# Patient Record
Sex: FEMALE | Race: WHITE | Employment: OTHER | URBAN - METROPOLITAN AREA
[De-identification: names, ages, dates, MRNs, and addresses within clinical notes are randomized per-mention and may not be internally consistent; named-entity substitution may affect disease eponyms.]

---

## 2017-12-28 VITALS — WEIGHT: 270 LBS | BODY MASS INDEX: 49.69 KG/M2 | HEIGHT: 62 IN

## 2017-12-28 RX ORDER — MONTELUKAST SODIUM 10 MG/1
10 TABLET ORAL
COMMUNITY
End: 2020-07-31

## 2017-12-28 NOTE — PERIOP NOTES
Call placed to Dr. Donovan Hernandez, PCP  [phone] office ~message left requesting most recent office note.

## 2017-12-28 NOTE — PERIOP NOTES
Patient verified name, , and surgery as listed in [facility]. Type 1b surgery, phone assessment complete. Orders YES received. Labs per surgeon: none  Labs per anesthesia protocol: potassium  To be drawn at outpatient lab   ECHO dated 16 obtained and placed on chart for review. Patient answered medical/surgical history questions at their best of ability. All prior to admission medications documented in [facility]. Patient instructed to take the following medications the day of surgery according to anesthesia guidelines with a small sip of water: 81 mg Aspirin, Buspar, Flexeril, Prozac, Omeprazole, Singulair; Norco, if needed . Hold all vitamins 7 days prior to surgery and NSAIDS 5 days prior to surgery. Medications to be held Aleve, hold for 5 days prior to surgery; decrease 325 mg Aspirin to 81 mg 5 days prior to surgery. Patient instructed on the following:  Arrive at A Entrance, time of arrival to be called the day before by 1700  NPO after midnight including gum, mints, and ice chips  Responsible adult must drive patient to the hospital, stay during surgery, and patient will  need supervision 24 hours after anesthesia  Use antibacterial soap in shower the night before surgery and on the morning of surgery  Leave all valuables (money and jewelry) at home but bring insurance card and ID on       DOS  Do not wear make-up, nail polish, lotions, cologne, perfumes, powders, or oil on skin. Patient teach back successful and patient demonstrates knowledge of instruction.

## 2017-12-29 ENCOUNTER — HOSPITAL ENCOUNTER (OUTPATIENT)
Dept: SURGERY | Age: 67
Discharge: HOME | End: 2017-12-29

## 2017-12-29 ENCOUNTER — HOSPITAL ENCOUNTER (OUTPATIENT)
Dept: LAB | Age: 67
Discharge: HOME | End: 2017-12-29
Attending: ORTHOPAEDIC SURGERY

## 2017-12-29 LAB — POTASSIUM SERPL-SCNC: 3.3 MMOL/L (ref 3.5–5.1)

## 2017-12-29 NOTE — PERIOP NOTES
Potassium done today within anesthesia protocols.     Recent Results (from the past 12 hour(s))   POTASSIUM    Collection Time: 12/29/17 11:40 AM   Result Value Ref Range    Potassium 3.3 (L) 3.5 - 5.1 mmol/L

## 2018-01-04 ENCOUNTER — ANESTHESIA EVENT (OUTPATIENT)
Dept: SURGERY | Age: 68
End: 2018-01-04
Payer: MEDICARE

## 2018-01-05 ENCOUNTER — HOSPITAL ENCOUNTER (OUTPATIENT)
Age: 68
Setting detail: OUTPATIENT SURGERY
Discharge: HOME | End: 2018-01-05
Attending: ORTHOPAEDIC SURGERY | Admitting: ORTHOPAEDIC SURGERY
Payer: MEDICARE

## 2018-01-05 ENCOUNTER — ANESTHESIA (OUTPATIENT)
Dept: SURGERY | Age: 68
End: 2018-01-05
Payer: MEDICARE

## 2018-01-05 VITALS
WEIGHT: 270 LBS | DIASTOLIC BLOOD PRESSURE: 74 MMHG | RESPIRATION RATE: 18 BRPM | SYSTOLIC BLOOD PRESSURE: 163 MMHG | HEIGHT: 62 IN | OXYGEN SATURATION: 92 % | BODY MASS INDEX: 49.69 KG/M2 | HEART RATE: 98 BPM | TEMPERATURE: 98 F

## 2018-01-05 DIAGNOSIS — M12.811 ROTATOR CUFF TEAR ARTHROPATHY OF RIGHT SHOULDER: Primary | ICD-10-CM

## 2018-01-05 DIAGNOSIS — M75.101 ROTATOR CUFF TEAR ARTHROPATHY OF RIGHT SHOULDER: Primary | ICD-10-CM

## 2018-01-05 PROCEDURE — 74011000250 HC RX REV CODE- 250: Performed by: ANESTHESIOLOGY

## 2018-01-05 PROCEDURE — 76010000149 HC OR TIME 1 TO 1.5 HR: Performed by: ORTHOPAEDIC SURGERY

## 2018-01-05 PROCEDURE — 74011250636 HC RX REV CODE- 250/636: Performed by: ANESTHESIOLOGY

## 2018-01-05 PROCEDURE — 77030003602 HC NDL NRV BLK BBMI -B: Performed by: ANESTHESIOLOGY

## 2018-01-05 PROCEDURE — 74011000250 HC RX REV CODE- 250

## 2018-01-05 PROCEDURE — 77030011263 HC ELECTRD ACRPLSTY CNMD -B: Performed by: ORTHOPAEDIC SURGERY

## 2018-01-05 PROCEDURE — 77030008477 HC STYL SATN SLP COVD -A: Performed by: ANESTHESIOLOGY

## 2018-01-05 PROCEDURE — 76942 ECHO GUIDE FOR BIOPSY: CPT | Performed by: ORTHOPAEDIC SURGERY

## 2018-01-05 PROCEDURE — L3650 SO 8 ABD RESTRAINT PRE OTS: HCPCS | Performed by: ORTHOPAEDIC SURGERY

## 2018-01-05 PROCEDURE — 76210000021 HC REC RM PH II 0.5 TO 1 HR: Performed by: ORTHOPAEDIC SURGERY

## 2018-01-05 PROCEDURE — 77030006891 HC BLD SHV RESECT STRY -B: Performed by: ORTHOPAEDIC SURGERY

## 2018-01-05 PROCEDURE — 94640 AIRWAY INHALATION TREATMENT: CPT

## 2018-01-05 PROCEDURE — 76060000034 HC ANESTHESIA 1.5 TO 2 HR: Performed by: ORTHOPAEDIC SURGERY

## 2018-01-05 PROCEDURE — A4565 SLINGS: HCPCS | Performed by: ORTHOPAEDIC SURGERY

## 2018-01-05 PROCEDURE — 74011250637 HC RX REV CODE- 250/637

## 2018-01-05 PROCEDURE — 76210000000 HC OR PH I REC 2 TO 2.5 HR: Performed by: ORTHOPAEDIC SURGERY

## 2018-01-05 PROCEDURE — 74011250636 HC RX REV CODE- 250/636

## 2018-01-05 PROCEDURE — 77030008703 HC TU ET UNCUF COVD -A: Performed by: ANESTHESIOLOGY

## 2018-01-05 PROCEDURE — 77030016570 HC BLNKT BAIR HGGR 3M -B: Performed by: ANESTHESIOLOGY

## 2018-01-05 PROCEDURE — 77030011640 HC PAD GRND REM COVD -A: Performed by: ORTHOPAEDIC SURGERY

## 2018-01-05 PROCEDURE — 77030002933 HC SUT MCRYL J&J -A: Performed by: ORTHOPAEDIC SURGERY

## 2018-01-05 PROCEDURE — 77030003666 HC NDL SPINAL BD -A: Performed by: ORTHOPAEDIC SURGERY

## 2018-01-05 PROCEDURE — 77030020782 HC GWN BAIR PAWS FLX 3M -B: Performed by: ANESTHESIOLOGY

## 2018-01-05 PROCEDURE — 77030018836 HC SOL IRR NACL ICUM -A: Performed by: ORTHOPAEDIC SURGERY

## 2018-01-05 PROCEDURE — 76010010054 HC POST OP PAIN BLOCK: Performed by: ORTHOPAEDIC SURGERY

## 2018-01-05 PROCEDURE — 77030010428: Performed by: ORTHOPAEDIC SURGERY

## 2018-01-05 RX ORDER — DEXAMETHASONE SODIUM PHOSPHATE 100 MG/10ML
INJECTION INTRAMUSCULAR; INTRAVENOUS AS NEEDED
Status: DISCONTINUED | OUTPATIENT
Start: 2018-01-05 | End: 2018-01-05 | Stop reason: HOSPADM

## 2018-01-05 RX ORDER — LIDOCAINE HYDROCHLORIDE 20 MG/ML
INJECTION, SOLUTION EPIDURAL; INFILTRATION; INTRACAUDAL; PERINEURAL AS NEEDED
Status: DISCONTINUED | OUTPATIENT
Start: 2018-01-05 | End: 2018-01-05 | Stop reason: HOSPADM

## 2018-01-05 RX ORDER — ROCURONIUM BROMIDE 10 MG/ML
INJECTION, SOLUTION INTRAVENOUS AS NEEDED
Status: DISCONTINUED | OUTPATIENT
Start: 2018-01-05 | End: 2018-01-05 | Stop reason: HOSPADM

## 2018-01-05 RX ORDER — LEVALBUTEROL INHALATION SOLUTION 1.25 MG/3ML
1.25 SOLUTION RESPIRATORY (INHALATION)
Status: COMPLETED | OUTPATIENT
Start: 2018-01-05 | End: 2018-01-05

## 2018-01-05 RX ORDER — MIDAZOLAM HYDROCHLORIDE 1 MG/ML
2 INJECTION, SOLUTION INTRAMUSCULAR; INTRAVENOUS
Status: DISCONTINUED | OUTPATIENT
Start: 2018-01-05 | End: 2018-01-05 | Stop reason: HOSPADM

## 2018-01-05 RX ORDER — OXYCODONE HYDROCHLORIDE 5 MG/1
5-10 TABLET ORAL
Qty: 40 TAB | Refills: 0 | Status: SHIPPED | OUTPATIENT
Start: 2018-01-05 | End: 2018-12-12

## 2018-01-05 RX ORDER — GLYCOPYRROLATE 0.2 MG/ML
INJECTION INTRAMUSCULAR; INTRAVENOUS AS NEEDED
Status: DISCONTINUED | OUTPATIENT
Start: 2018-01-05 | End: 2018-01-05 | Stop reason: HOSPADM

## 2018-01-05 RX ORDER — FENTANYL CITRATE 50 UG/ML
100 INJECTION, SOLUTION INTRAMUSCULAR; INTRAVENOUS ONCE
Status: COMPLETED | OUTPATIENT
Start: 2018-01-05 | End: 2018-01-05

## 2018-01-05 RX ORDER — SUCCINYLCHOLINE CHLORIDE 20 MG/ML
INJECTION INTRAMUSCULAR; INTRAVENOUS AS NEEDED
Status: DISCONTINUED | OUTPATIENT
Start: 2018-01-05 | End: 2018-01-05 | Stop reason: HOSPADM

## 2018-01-05 RX ORDER — OXYCODONE AND ACETAMINOPHEN 10; 325 MG/1; MG/1
1 TABLET ORAL AS NEEDED
Status: DISCONTINUED | OUTPATIENT
Start: 2018-01-05 | End: 2018-01-05 | Stop reason: HOSPADM

## 2018-01-05 RX ORDER — ALBUTEROL SULFATE 90 UG/1
AEROSOL, METERED RESPIRATORY (INHALATION) AS NEEDED
Status: DISCONTINUED | OUTPATIENT
Start: 2018-01-05 | End: 2018-01-05 | Stop reason: HOSPADM

## 2018-01-05 RX ORDER — ONDANSETRON 2 MG/ML
INJECTION INTRAMUSCULAR; INTRAVENOUS AS NEEDED
Status: DISCONTINUED | OUTPATIENT
Start: 2018-01-05 | End: 2018-01-05 | Stop reason: HOSPADM

## 2018-01-05 RX ORDER — ROPIVACAINE HYDROCHLORIDE 5 MG/ML
INJECTION, SOLUTION EPIDURAL; INFILTRATION; PERINEURAL AS NEEDED
Status: DISCONTINUED | OUTPATIENT
Start: 2018-01-05 | End: 2018-01-05 | Stop reason: HOSPADM

## 2018-01-05 RX ORDER — SODIUM CHLORIDE 0.9 % (FLUSH) 0.9 %
5-10 SYRINGE (ML) INJECTION AS NEEDED
Status: DISCONTINUED | OUTPATIENT
Start: 2018-01-05 | End: 2018-01-05 | Stop reason: HOSPADM

## 2018-01-05 RX ORDER — PROPOFOL 10 MG/ML
INJECTION, EMULSION INTRAVENOUS AS NEEDED
Status: DISCONTINUED | OUTPATIENT
Start: 2018-01-05 | End: 2018-01-05 | Stop reason: HOSPADM

## 2018-01-05 RX ORDER — OXYCODONE HYDROCHLORIDE 5 MG/1
5 TABLET ORAL
Status: DISCONTINUED | OUTPATIENT
Start: 2018-01-05 | End: 2018-01-05 | Stop reason: HOSPADM

## 2018-01-05 RX ORDER — HYDROMORPHONE HYDROCHLORIDE 2 MG/ML
0.5 INJECTION, SOLUTION INTRAMUSCULAR; INTRAVENOUS; SUBCUTANEOUS
Status: DISCONTINUED | OUTPATIENT
Start: 2018-01-05 | End: 2018-01-05 | Stop reason: HOSPADM

## 2018-01-05 RX ORDER — EPHEDRINE SULFATE 50 MG/ML
INJECTION, SOLUTION INTRAVENOUS AS NEEDED
Status: DISCONTINUED | OUTPATIENT
Start: 2018-01-05 | End: 2018-01-05 | Stop reason: HOSPADM

## 2018-01-05 RX ORDER — FENTANYL CITRATE 50 UG/ML
INJECTION, SOLUTION INTRAMUSCULAR; INTRAVENOUS AS NEEDED
Status: DISCONTINUED | OUTPATIENT
Start: 2018-01-05 | End: 2018-01-05 | Stop reason: HOSPADM

## 2018-01-05 RX ORDER — SODIUM CHLORIDE, SODIUM LACTATE, POTASSIUM CHLORIDE, CALCIUM CHLORIDE 600; 310; 30; 20 MG/100ML; MG/100ML; MG/100ML; MG/100ML
75 INJECTION, SOLUTION INTRAVENOUS CONTINUOUS
Status: DISCONTINUED | OUTPATIENT
Start: 2018-01-05 | End: 2018-01-05 | Stop reason: HOSPADM

## 2018-01-05 RX ORDER — NEOSTIGMINE METHYLSULFATE 1 MG/ML
INJECTION INTRAVENOUS AS NEEDED
Status: DISCONTINUED | OUTPATIENT
Start: 2018-01-05 | End: 2018-01-05 | Stop reason: HOSPADM

## 2018-01-05 RX ADMIN — FENTANYL CITRATE 100 MCG: 50 INJECTION INTRAMUSCULAR; INTRAVENOUS at 08:57

## 2018-01-05 RX ADMIN — GLYCOPYRROLATE 0.4 MG: 0.2 INJECTION INTRAMUSCULAR; INTRAVENOUS at 11:58

## 2018-01-05 RX ADMIN — RACEPINEPHRINE HYDROCHLORIDE 0.5 ML: 11.25 SOLUTION RESPIRATORY (INHALATION) at 12:58

## 2018-01-05 RX ADMIN — SUCCINYLCHOLINE CHLORIDE 120 MG: 20 INJECTION INTRAMUSCULAR; INTRAVENOUS at 10:55

## 2018-01-05 RX ADMIN — SODIUM CHLORIDE, SODIUM LACTATE, POTASSIUM CHLORIDE, AND CALCIUM CHLORIDE: 600; 310; 30; 20 INJECTION, SOLUTION INTRAVENOUS at 11:34

## 2018-01-05 RX ADMIN — DEXAMETHASONE SODIUM PHOSPHATE 4 MG: 100 INJECTION INTRAMUSCULAR; INTRAVENOUS at 11:25

## 2018-01-05 RX ADMIN — ALBUTEROL SULFATE 3 PUFF: 90 AEROSOL, METERED RESPIRATORY (INHALATION) at 11:00

## 2018-01-05 RX ADMIN — ROCURONIUM BROMIDE 30 MG: 10 INJECTION, SOLUTION INTRAVENOUS at 11:20

## 2018-01-05 RX ADMIN — FENTANYL CITRATE 100 MCG: 50 INJECTION, SOLUTION INTRAMUSCULAR; INTRAVENOUS at 10:55

## 2018-01-05 RX ADMIN — LEVALBUTEROL 1.25 MG: 1.25 SOLUTION RESPIRATORY (INHALATION) at 12:23

## 2018-01-05 RX ADMIN — ONDANSETRON 4 MG: 2 INJECTION INTRAMUSCULAR; INTRAVENOUS at 11:54

## 2018-01-05 RX ADMIN — LIDOCAINE HYDROCHLORIDE 60 MG: 20 INJECTION, SOLUTION EPIDURAL; INFILTRATION; INTRACAUDAL; PERINEURAL at 10:55

## 2018-01-05 RX ADMIN — ROCURONIUM BROMIDE 5 MG: 10 INJECTION, SOLUTION INTRAVENOUS at 10:55

## 2018-01-05 RX ADMIN — ROPIVACAINE HYDROCHLORIDE 20 ML: 5 INJECTION, SOLUTION EPIDURAL; INFILTRATION; PERINEURAL at 09:00

## 2018-01-05 RX ADMIN — MIDAZOLAM HYDROCHLORIDE 1 MG: 1 INJECTION, SOLUTION INTRAMUSCULAR; INTRAVENOUS at 08:57

## 2018-01-05 RX ADMIN — SODIUM CHLORIDE, SODIUM LACTATE, POTASSIUM CHLORIDE, AND CALCIUM CHLORIDE 75 ML/HR: 600; 310; 30; 20 INJECTION, SOLUTION INTRAVENOUS at 08:43

## 2018-01-05 RX ADMIN — NEOSTIGMINE METHYLSULFATE 9 MG: 1 INJECTION INTRAVENOUS at 11:58

## 2018-01-05 RX ADMIN — EPHEDRINE SULFATE 15 MG: 50 INJECTION, SOLUTION INTRAVENOUS at 11:16

## 2018-01-05 RX ADMIN — PROPOFOL 140 MG: 10 INJECTION, EMULSION INTRAVENOUS at 10:55

## 2018-01-05 NOTE — ANESTHESIA POSTPROCEDURE EVALUATION
Post-Anesthesia Evaluation and Assessment    Patient: Denis Butler MRN: [id number]  SSN: [id number]    YOB: 1950  Age: 69 y.o. Sex: female       Cardiovascular Function/Vital Signs  Visit Vitals    /58 (BP 1 Location: Left arm, BP Patient Position: At rest)    Pulse (!) 112    Temp 36.7 °C (98 °F)    Resp 18    Ht 5' 2\" (1.575 m)    Wt 122.5 kg (270 lb)    SpO2 93%    BMI 49.38 kg/m2       Patient is status post general anesthesia for Procedure(s):  RIGHT ARTHROSCOPIC BICEPS TENOTOMY and ACROMIOPLASTY. Nausea/Vomiting: None    Postoperative hydration reviewed and adequate. Pain:  Pain Scale 1: Visual (01/05/18 1213)  Pain Intensity 1: 0 (01/05/18 1213)   Managed    Neurological Status:   Neuro (WDL): Exceptions to WDL (01/05/18 1213)  Neuro  Neurologic State: Drowsy (01/05/18 1213)  LUE Motor Response: Purposeful (01/05/18 1213)  LLE Motor Response: Purposeful (01/05/18 1213)  RUE Motor Response: Numbness; Pharmocologically paralyzed (01/05/18 1213)  RLE Motor Response: Purposeful (01/05/18 1213)   At baseline    Mental Status and Level of Consciousness: Arousable    Pulmonary Status:   O2 Device: Nasal cannula (01/05/18 1243)   Adequate oxygenation and airway patent    Complications related to anesthesia: None    Post-anesthesia assessment completed.  No concerns    Signed By: Gio Pinedo MD     January 5, 2018

## 2018-01-05 NOTE — ANESTHESIA PREPROCEDURE EVALUATION
Anesthetic History     PONV          Review of Systems / Medical History  Patient summary reviewed and pertinent labs reviewed    Pulmonary  Within defined limits                 Neuro/Psych         Psychiatric history    Comments: Chronic pain syndrome-long term PO narcotics Cardiovascular    Hypertension: well controlled              Exercise tolerance: <4 METS     GI/Hepatic/Renal     GERD: well controlled           Endo/Other        Morbid obesity and arthritis     Other Findings              Physical Exam    Airway  Mallampati: II  TM Distance: > 6 cm  Neck ROM: normal range of motion   Mouth opening: Normal     Cardiovascular    Rhythm: regular           Dental    Dentition: Full upper dentures     Pulmonary                 Abdominal  GI exam deferred       Other Findings            Anesthetic Plan    ASA: 3  Anesthesia type: general - interscalene block      Post-op pain plan if not by surgeon: peripheral nerve block single    Induction: Intravenous  Anesthetic plan and risks discussed with: Patient

## 2018-01-05 NOTE — ANESTHESIA PROCEDURE NOTES
Peripheral Block    Start time: 1/5/2018 8:57 AM  End time: 1/5/2018 9:00 AM  Performed by: Star Fenton by: Jeannette Hernandez: Indications: at surgeon's request and post-op pain management    Preanesthetic Checklist: patient identified, risks and benefits discussed, site marked, timeout performed, anesthesia consent given and patient being monitored    Timeout Time: 08:57          Block Type:   Block Type:   Interscalene  Laterality:  Right  Monitoring:  Standard ASA monitoring, continuous pulse ox, frequent vital sign checks, heart rate, oxygen and responsive to questions  Injection Technique:  Single shot  Procedures: ultrasound guided and nerve stimulator    Patient Position: supine  Location:  Interscalene  Needle Gauge:  21 G  Needle Localization:  Anatomical landmarks and ultrasound guidance  Motor Response: minimal motor response >0.4 mA    Medication Injected:  0.5%  ropivacaine  Volume (mL):  20    Assessment:  Number of attempts:  1  Injection Assessment:  Incremental injection every 5 mL, local visualized surrounding nerve on ultrasound, negative aspiration for blood, no intravascular symptoms, no paresthesia and ultrasound image on chart  Patient tolerance:  Patient tolerated the procedure well with no immediate complications

## 2018-01-05 NOTE — IP AVS SNAPSHOT
[address] [place] [address] [id number] 
[phone] Patient: Devon Patino MRN: [id number]:[id number] About your hospitalization You were admitted on:  January 5, 2018 You last received care in the:  [facility] PACU You were discharged on:  January 5, 2018 Why you were hospitalized Your primary diagnosis was:  Not on File Follow-up Information Follow up With Details Comments Contact Info [address] [facility] [place] [address] [phone] Discharge Orders None A check celia indicates which time of day the medication should be taken. My Medications CHANGE how you take these medications Instructions Each Dose to Equal  
 Morning Noon Evening Bedtime * oxyCODONE IR 10 mg Tab immediate release tablet Commonly known as:  mAy Vargas What changed:  Another medication with the same name was added. Make sure you understand how and when to take each. Your last dose was: Your next dose is: Take 0.5 Tabs by mouth every four (4) hours as needed. Max Daily Amount: 30 mg. Indications: PAIN  
 5 mg * oxyCODONE IR 5 mg immediate release tablet Commonly known as:  Amy Vargas What changed: You were already taking a medication with the same name, and this prescription was added. Make sure you understand how and when to take each. Your last dose was: Your next dose is: Take 1-2 Tabs by mouth every four (4) hours as needed for Pain. Max Daily Amount: 60 mg.  
 5-10 mg  
    
   
   
   
  
 * Notice: This list has 2 medication(s) that are the same as other medications prescribed for you. Read the directions carefully, and ask your doctor or other care provider to review them with you. CONTINUE taking these medications Instructions Each Dose to Equal  
 Morning Noon Evening Bedtime ALEVE 220 mg Cap Generic drug:  naproxen sodium Your last dose was: Your next dose is: Take  by mouth. Stop 5 days prior to surgery per anesthesia guidelines. Indications: PAIN  
     
   
   
   
  
 aspirin 325 mg tablet Commonly known as:  ASPIRIN Your last dose was: Your next dose is: Take 325 mg by mouth daily. Decrease to 81 mg starting 5 days prior to surgery   Indications: \"stroke prevention\" 325 mg  
    
   
   
   
  
 busPIRone 5 mg tablet Commonly known as:  BUSPAR Your last dose was: Your next dose is: Take 5 mg by mouth daily. Take morning of surgery per anesthesia guidelines. 5 mg  
    
   
   
   
  
 cyclobenzaprine 10 mg tablet Commonly known as:  FLEXERIL Your last dose was: Your next dose is: Take  by mouth three (3) times daily. Take morning of surgery per anesthesia guidelines. Indications: back pain FLUoxetine 20 mg capsule Commonly known as:  PROzac Your last dose was: Your next dose is: Take 20 mg by mouth daily. Take morning of surgery per anesthesia guidelines. Indications: anxiety 20 mg  
    
   
   
   
  
 gabapentin 600 mg tablet Commonly known as:  NEURONTIN Your last dose was: Your next dose is: Take 600 mg by mouth nightly. Indications: RESTLESS LEGS SYNDROME  
 600 mg  
    
   
   
   
  
 indapamide 2.5 mg tablet Commonly known as:  José Saas Your last dose was: Your next dose is: Take  by mouth daily. Indications: HYPERTENSION  
     
   
   
   
  
 LASIX 40 mg tablet Generic drug:  furosemide Your last dose was: Your next dose is: Take 40 mg by mouth as needed. Indications: EDEMA 40 mg  
    
   
   
   
  
 lidocaine 5 % Commonly known as:  Tino Esquivel Your last dose was: Your next dose is: by TransDERmal route as needed. Apply patch to the affected area for 12 hours a day and remove for 12 hours a day. lisinopril 20 mg tablet Commonly known as:  Amber Carrillo Your last dose was: Your next dose is: Take 20 mg by mouth daily. Indications: HYPERTENSION  
 20 mg NORCO 5-325 mg per tablet Generic drug:  HYDROcodone-acetaminophen Your last dose was: Your next dose is: Take  by mouth as needed for Pain. Take morning of surgery per anesthesia guidelines if needed   Indications: PAIN  
     
   
   
   
  
 omeprazole 20 mg capsule Commonly known as:  PRILOSEC Your last dose was: Your next dose is: Take 20 mg by mouth two (2) times a day. Take morning of surgery per anesthesia guidelines. Indications: GASTROESOPHAGEAL REFLUX  
 20 mg  
    
   
   
   
  
 potassium chloride SR 8 mEq tablet Commonly known as:  KLOR-CON 8 Your last dose was: Your next dose is: Take 8 mEq by mouth two (2) times a day. Indications: HYPOKALEMIA PREVENTION  
 8 mEq  
    
   
   
   
  
 pramipexole 0.25 mg tablet Commonly known as:  MIRAPEX Your last dose was: Your next dose is: Take 0.25 mg by mouth nightly. Indications: RESTLESS LEGS SYNDROME  
 0.25 mg  
    
   
   
   
  
 SINGULAIR 10 mg tablet Generic drug:  montelukast  
   
Your last dose was: Your next dose is: Take 10 mg by mouth daily. For fluid behind left ear; r/t MRSA ; Take / use AM day of surgery  per anesthesia protocols. Indications: Allergic Rhinitis 10 mg  
    
   
   
   
  
 tolterodine ER 4 mg ER capsule Commonly known as:  Akash Hobbs Your last dose was: Your next dose is: Take 4 mg by mouth nightly. 4 mg VITAMIN B COMPLEX PO Your last dose was: Your next dose is: Take  by mouth daily. VITAMIN D3 PO Your last dose was: Your next dose is: Take  by mouth daily. VITAMIN E ACETATE PO Your last dose was: Your next dose is: Take  by mouth daily. Where to Get Your Medications Information on where to get these meds will be given to you by the nurse or doctor. ! Ask your nurse or doctor about these medications  
  oxyCODONE IR 5 mg immediate release tablet Discharge Instructions Arthroscopic Acromioplasty Discharge Instructions ACTIVITY:  
- You may use your operated arm as much as you pain allows you to use it - Use arm sling until you are comfortable without it 
- It's OK to bathe or shower as you normally would. Your dressing is waterproof. DIET: 
- Clear liquids until no nausea or vomiting; then light diet for the first day - Advance to regular diet as you feel like it 
- If nausea and vomiting occurs,use the phenergan prescription you were given by Dr Radha Ardon. If the phenergan doesn't work please call our doctor on call. (Call [phone] if it  is after regular office hours) PAIN: 
- Take pain medication(s) as directed by the prescription you were given - Remember that it often helps to take acetaminophen with your pain medicine IF IT DOES NOT CONTAIN ACETAMINOPHEN. You may take up to 8 extra-strength tylenol or up to 12 regular tylenol per 24 hours. - You may also use ibuprofen 800 mg every six hours or aleve 440 mg every 8 hours IN ADDITION TO your prescribed pain medicine - Call Dr Radha Ardon if pain is NOT adequately relieved by medication DRESSING CARE: 
- There is no need to change your dressing before your follow up visit CALL YOUR DOCTOR IF: 
- You notice excessive bleeding that does not stop after holding mild pressure over the area - Temperature of 100.5°F or greater - Redness, excessive swelling or bruising, and/or green or yellow, smelly discharge from incision AFTER ANESTHESIA: 
- For the next 12 hours: DO NOT drive, drink alcoholic beverages, or make important decisions - Be aware of dizziness following anesthesia and while taking pain medication(s) MEDICATIONS: 
Continue your usual home medications as previously prescribed unless you were told otherwise Signed: Mohan Fuller MD 
1/5/2018 
12:03 PM 
 
 
 
 
 
  
  
  
Introducing \[facility]\"" & [facility] SERVICES! Daren Vazquez introduces [employer] patient portal. Now you can access parts of your medical record, email your doctor's office, and request medication refills online. 1. In your internet browser, go to [email or web address] 2. Click on the First Time User? Click Here link in the Sign In box. You will see the New Member Sign Up page. 3. Enter your [employer] Access Code exactly as it appears below. You will not need to use this code after youve completed the sign-up process. If you do not sign up before the expiration date, you must request a new code. · [employer] Access Code: [email or web address] Expires: 3/27/2018  5:26 AM 
 
4. Enter the last four digits of your Social Security Number (xxxx) and Date of Birth (mm/dd/yyyy) as indicated and click Submit. You will be taken to the next sign-up page. 5. Create a [employer] ID. This will be your [employer] login ID and cannot be changed, so think of one that is secure and easy to remember. 6. Create a [employer] password. You can change your password at any time. 7. Enter your Password Reset Question and Answer. This can be used at a later time if you forget your password. 8. Enter your e-mail address. You will receive e-mail notification when new information is available in [address] 9. Click Sign Up. You can now view and download portions of your medical record. 10. Click the Download Summary menu link to download a portable copy of your medical information. If you have questions, please visit the Frequently Asked Questions section of the [employer] website. Remember, [employer] is NOT to be used for urgent needs. For medical emergencies, dial 911. Now available from your iPhone and Android! Providers Seen During Your Hospitalization Provider Specialty Primary office phone Maple Osler, MD Orthopedic Surgery [phone] Your Primary Care Physician (PCP) Primary Care Physician Office Phone Office Fax Marely Dave Bronson [phone] You are allergic to the following No active allergies Recent Documentation Height Weight BMI OB Status Smoking Status 1.575 m 122.5 kg 49.38 kg/m2 Hysterectomy Never Smoker Emergency Contacts Name Discharge Info Relation Home Work Mobile Elbert Dys DISCHARGE CAREGIVER [3] Spouse [3]   [phone] Patient Belongings The following personal items are in your possession at time of discharge: 
  Dental Appliances: Uppers  Visual Aid: Glasses Please provide this summary of care documentation to your next provider. Signatures-by signing, you are acknowledging that this After Visit Summary has been reviewed with you and you have received a copy. Patient Signature:  ____________________________________________________________ Date:  ____________________________________________________________  
  
Sharee Dotson Provider Signature:  ____________________________________________________________ Date:  ____________________________________________________________

## 2018-01-05 NOTE — BRIEF OP NOTE
BRIEF OPERATIVE NOTE    Date of Procedure: 1/5/2018   Preoperative Diagnosis: Strain of muscle, fascia and tendon of other parts of biceps, right arm, initial encounter [S46.211A]  Postoperative Diagnosis: Strain of muscle, fascia and tendon of other parts of biceps, right arm, initial encounter [K76.246Q]    Procedure(s):  RIGHT ARTHROSCOPIC BICEPS TENOTOMY and ACROMIOPLASTY  Surgeon(s) and Role:     * Silvestre Carrillo MD - Primary         Assistant Staff:       Surgical Staff:  Circ-1: Aviva Fitzgerald RN  Scrub Tech-1: Awais Zheng  Scrub Tech-2: Chelsea Klinefelter  Event Time In   Incision Start 1119   Incision Close 1156     Anesthesia: General   Estimated Blood Loss: <50cc  Specimens: * No specimens in log *   Findings: cuff tear arthropathy, subscap teard, complete,    Complications: none  Implants: * No implants in log *

## 2018-01-05 NOTE — OP NOTES
[facility] 63  OPERATIVE REPORT    Agus Foote  MR#: [id number]  : 1950  ACCOUNT #: [de-identified]   DATE OF SERVICE: 2018    PREOPERATIVE DIAGNOSES:    1. Partial biceps tendon tear of the right shoulder. 2.  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 POSTOPERATIVE  DIAGNOSES:    1. Partial biceps tendon tear of the right shoulder. 2.  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 PROCEDURES PERFORMED:    1. Biceps tenotomy. 2.  Arthroscopic acromioplasty. SURGEON:  Mari Bartlett MD.      ANESTHESIA:       ESTIMATED BLOOD LOSS:  <50cc    SPECIMENS REMOVED:      COMPLICATIONS:  none     FINDINGS:  She had a massive rotator cuff tear. The majority of the articular surface of the glenoid was severely diseased, but there were no areas of bare bone. The majority of the humeral head articular cartilage was fibrillated and the central portion had a thin layer of fibrocartilage only involving a surface area about the size of a quarter. Her biceps tendon had substantial longitudinal tearing and was impinging within the joint. At the completion of the tenotomy, none of that remained within the joint. She had no connected subscapularis tendon. The inferior and middle glenohumeral ligament remained connected. PROCEDURE IN DETAIL:  In the operating room, she was anesthetized. In the beach chair, position her right shoulder was prepped and draped in a sterile fashion. I distended the shoulder with irrigant from a posterior puncture and placed an 18-gauge needle through the rotator cuff interval.  The arthroscope was inserted posteriorly and the initial survey was undertaken. The highlights were photographed. Through the rotator cuff interval, I inserted electrocautery and began biceps tenotomy. I completed the tenotomy with an aggressive shaver.   The shaver was also used to debride the edge of the rotator cuff tendon to ensure that none would pinch within the joint. A lateral acromial incision was made and using an aggressive cutter, an acromioplasty was used to create a flat undersurface of acromion. The highlights of the procedure were photographed. All apparatus was removed and the wounds were then closed with interrupted Monocryl in the subcutaneous, Mastisol and Steri-Strips on the skin followed by the application of a shoulder immobilizer.       Araseli Gordon MD       [facility] / [facility]  D: 01/05/2018 12:07     T: 01/05/2018 12:35  JOB #: [id number]

## 2018-01-05 NOTE — ANESTHESIA POSTPROCEDURE EVALUATION
Post-Anesthesia Evaluation and Assessment    Patient: Josh Everett MRN: [id number]  SSN: [id number]    YOB: 1950  Age: 69 y.o. Sex: female       Cardiovascular Function/Vital Signs  Visit Vitals    /74    Pulse 98    Temp 36.7 °C (98 °F)    Resp 18    Ht 5' 2\" (1.575 m)    Wt 122.5 kg (270 lb)    SpO2 92%    BMI 49.38 kg/m2       Patient is status post general anesthesia for Procedure(s):  RIGHT ARTHROSCOPIC BICEPS TENOTOMY and ACROMIOPLASTY. Nausea/Vomiting: None    Postoperative hydration reviewed and adequate. Pain:  Pain Scale 1: Numeric (0 - 10) (01/05/18 1413)  Pain Intensity 1: 0 (01/05/18 1413)   Managed    Neurological Status:   Neuro (WDL): Exceptions to WDL (01/05/18 1258)  Neuro  Neurologic State: Alert (01/05/18 1258)  LUE Motor Response: Purposeful (01/05/18 1258)  LLE Motor Response: Purposeful (01/05/18 1258)  RUE Motor Response: Numbness (01/05/18 1258)  RLE Motor Response: Purposeful (01/05/18 1258)   Block resolving    Mental Status and Level of Consciousness: Alert and oriented     Pulmonary Status:   O2 Device: Room air (01/05/18 1413)   Adequate oxygenation and airway patent    Complications related to anesthesia: None    Post-anesthesia assessment completed.  No concerns    Signed By: Cleo Muller MD     January 5, 2018

## 2018-01-05 NOTE — DISCHARGE INSTRUCTIONS
Arthroscopic Acromioplasty Discharge Instructions    ACTIVITY:   - You may use your operated arm as much as you pain allows you to use it  - Use arm sling until you are comfortable without it  - It's OK to bathe or shower as you normally would. Your dressing is waterproof. DIET:  - Clear liquids until no nausea or vomiting; then light diet for the first day  - Advance to regular diet as you feel like it  - If nausea and vomiting occurs,use the phenergan prescription you were given by Dr Hinton Brunner. If the phenergan doesn't work please call our doctor on call. (Call [phone] if it  is after regular office hours)    PAIN:  - Take pain medication(s) as directed by the prescription you were given  - Remember that it often helps to take acetaminophen with your pain medicine IF IT DOES NOT CONTAIN ACETAMINOPHEN. You may take up to 8 extra-strength tylenol or up to 12 regular tylenol per 24 hours.    - You may also use ibuprofen 800 mg every six hours or aleve 440 mg every 8 hours IN ADDITION TO your prescribed pain medicine  - Call Dr Hinton Brunner if pain is NOT adequately relieved by medication    DRESSING CARE:  - There is no need to change your dressing before your follow up visit    Jim 18 IF:  - You notice excessive bleeding that does not stop after holding mild pressure over the area  - Temperature of 100.5°F or greater  - Redness, excessive swelling or bruising, and/or green or yellow, smelly discharge from incision    AFTER ANESTHESIA:  - For the next 12 hours: DO NOT drive, drink alcoholic beverages, or make important decisions  - Be aware of dizziness following anesthesia and while taking pain medication(s)    MEDICATIONS:  Continue your usual home medications as previously prescribed unless you were told otherwise    Signed:  Cierra Mclaughlin MD  1/5/2018  12:03 PM

## 2018-12-07 RX ORDER — CEFAZOLIN SODIUM/WATER 2 G/20 ML
2 SYRINGE (ML) INTRAVENOUS ONCE
Status: CANCELLED | OUTPATIENT
Start: 2018-12-07 | End: 2018-12-07

## 2018-12-12 ENCOUNTER — HOSPITAL ENCOUNTER (OUTPATIENT)
Dept: SURGERY | Age: 68
Discharge: HOME | End: 2018-12-12
Attending: ORTHOPAEDIC SURGERY
Payer: MEDICARE

## 2018-12-12 LAB
ANION GAP SERPL CALC-SCNC: 10 MMOL/L
BACTERIA SPEC CULT: ABNORMAL
BUN SERPL-MCNC: 34 MG/DL (ref 8–23)
CALCIUM SERPL-MCNC: 8.9 MG/DL (ref 8.3–10.4)
CHLORIDE SERPL-SCNC: 101 MMOL/L (ref 98–107)
CO2 SERPL-SCNC: 29 MMOL/L (ref 21–32)
CREAT SERPL-MCNC: 1.59 MG/DL (ref 0.6–1)
ERYTHROCYTE [DISTWIDTH] IN BLOOD BY AUTOMATED COUNT: 15.2 % (ref 11.9–14.6)
GLUCOSE SERPL-MCNC: 126 MG/DL (ref 65–100)
HCT VFR BLD AUTO: 39.6 % (ref 35.8–46.3)
HGB BLD-MCNC: 12.6 G/DL (ref 11.7–15.4)
MCH RBC QN AUTO: 31.6 PG (ref 26.1–32.9)
MCHC RBC AUTO-ENTMCNC: 31.8 G/DL (ref 31.4–35)
MCV RBC AUTO: 99.2 FL (ref 79.6–97.8)
NRBC # BLD: 0 K/UL (ref 0–0.2)
PLATELET # BLD AUTO: 198 K/UL (ref 150–450)
PMV BLD AUTO: 11.7 FL (ref 9.4–12.3)
POTASSIUM SERPL-SCNC: 3.3 MMOL/L (ref 3.5–5.1)
RBC # BLD AUTO: 3.99 M/UL (ref 4.05–5.2)
SERVICE CMNT-IMP: ABNORMAL
SODIUM SERPL-SCNC: 140 MMOL/L (ref 136–145)
WBC # BLD AUTO: 12 K/UL (ref 4.3–11.1)

## 2018-12-12 PROCEDURE — 87641 MR-STAPH DNA AMP PROBE: CPT

## 2018-12-12 PROCEDURE — 85027 COMPLETE CBC AUTOMATED: CPT

## 2018-12-12 PROCEDURE — 80048 BASIC METABOLIC PNL TOTAL CA: CPT

## 2018-12-12 RX ORDER — METOPROLOL SUCCINATE 25 MG/1
25 TABLET, EXTENDED RELEASE ORAL DAILY
Status: ON HOLD | COMMUNITY
End: 2020-08-11

## 2018-12-12 RX ORDER — FOLIC ACID 1 MG/1
1 TABLET ORAL DAILY
COMMUNITY
End: 2020-07-31

## 2018-12-12 RX ORDER — WARFARIN 2.5 MG/1
2.5 TABLET ORAL
COMMUNITY

## 2018-12-12 RX ORDER — MELOXICAM 15 MG/1
15 TABLET ORAL
COMMUNITY

## 2018-12-12 RX ORDER — AMIODARONE HYDROCHLORIDE 200 MG/1
200 TABLET ORAL DAILY
COMMUNITY

## 2018-12-12 RX ORDER — ACETAMINOPHEN, DIPHENHYDRAMINE HCL, PHENYLEPHRINE HCL 325; 25; 5 MG/1; MG/1; MG/1
1 TABLET ORAL DAILY
COMMUNITY

## 2018-12-12 RX ORDER — ALLOPURINOL 300 MG/1
300 TABLET ORAL DAILY
COMMUNITY

## 2018-12-12 RX ORDER — ASPIRIN 81 MG/1
81 TABLET ORAL DAILY
COMMUNITY

## 2018-12-12 NOTE — PERIOP NOTES
Patient verified name and     Order for consent not found in EHR. Type 3 surgery, PAT walk in assessment complete. Labs per surgeon: No orders. Labs per anesthesia protocol: cbc and bmp; results pending. EKG: Done 10/26/18 at [facility] in [place]. Last EKG x 2, Echo, stress and office note requested from medical records at [facility]. Left voicemail on nurses line at [facility] requesting a 5 day coumadin hold. Pt states that Dr. Miguel Stearns told her that coumadin could be held for 5 days. Call placed to Chano at Dr. Anirudh Gamez office requesting cardiac clearance note that was faxed to her. Chano states she was give a verbal ok for clearance because office could not get a fax to go through. Hibiclens and instructions given per hospital policy. Patient provided with and instructed on educational handouts including Guide to Surgery, Pain Management, Hand Hygiene, Blood Transfusion Education, and [facility] Brochure. Patient answered medical/surgical history questions at their best of ability. All prior to admission medications documented in [facility]. Original medication prescription bottle was visualized during patient appointment. Patient instructed to hold all vitamins 7 days prior to surgery and NSAIDS 5 days prior to surgery, patient verbalized understanding. Medications to be held: meloxicam, vitamins, supplements and coumadin for 5 days. Patient instructed to continue previous medications as prescribed prior to surgery and to take the following medications the day of surgery according to anesthesia guidelines with a small sip of water: allopurinol, amiodarone, aspirin, buspar, prozac, neurontin, hydrocodone if needed, metoprolol, singulair and prilosec. Diana Alonso Patient teach back successful and patient demonstrates knowledge of instructions.

## 2018-12-12 NOTE — PERIOP NOTES
Received call from [facility] and permission given to hold coumadin for 5-7 days prior to surgery. Last office note, EKG, echo and stress received from [facility]- will have anesthesia review. Left voicemail with medical records at Dr. Raquel Mina office requesting an old EKG for comparison to be faxed to [phone]. WBC level from today elevated and faxed to Dr. Blair Sparks. Creatinine level elevated- will have anesthesia review. After reviewing PCP notes in care everywhere, Pt is a diabetic. Pt denied diabetes and stated that she was told that she could stop taking her metformin because her diabetes was now controlled. Pt's last hgbA1C was 6.3 on 8/24/18 and per PCP note dated 9/17/18 pt was told that she could reduce her metformin due to diarrhea. Will have anesthesia review PCP note dated 9/17/18.

## 2018-12-12 NOTE — PERIOP NOTES
Recent Results (from the past 12 hour(s))   CBC W/O DIFF    Collection Time: 12/12/18  3:31 PM   Result Value Ref Range    WBC 12.0 (H) 4.3 - 11.1 K/uL    RBC 3.99 (L) 4.05 - 5.2 M/uL    HGB 12.6 11.7 - 15.4 g/dL    HCT 39.6 35.8 - 46.3 %    MCV 99.2 (H) 79.6 - 97.8 FL    MCH 31.6 26.1 - 32.9 PG    MCHC 31.8 31.4 - 35.0 g/dL    RDW 15.2 (H) 11.9 - 14.6 %    PLATELET 543 405 - 884 K/uL    MPV 11.7 9.4 - 12.3 FL    ABSOLUTE NRBC 0.00 0.0 - 0.2 K/uL   METABOLIC PANEL, BASIC    Collection Time: 12/12/18  3:31 PM   Result Value Ref Range    Sodium 140 136 - 145 mmol/L    Potassium 3.3 (L) 3.5 - 5.1 mmol/L    Chloride 101 98 - 107 mmol/L    CO2 29 21 - 32 mmol/L    Anion gap 10 mmol/L    Glucose 126 (H) 65 - 100 mg/dL    BUN 34 (H) 8 - 23 MG/DL    Creatinine 1.59 (H) 0.6 - 1.0 MG/DL    GFR est AA 41 (L) >60 ml/min/1.73m2    GFR est non-AA 34 ml/min/1.73m2    Calcium 8.9 8.3 - 10.4 MG/DL

## 2018-12-13 NOTE — PERIOP NOTES
Dr. Ashlie Malin reviewed chart including EKG, stress, echo, all office records and labs. Dr. Ashlie Malin states ok to proceed with surgery.

## 2018-12-18 ENCOUNTER — ANESTHESIA EVENT (OUTPATIENT)
Dept: SURGERY | Age: 68
DRG: 483 | End: 2018-12-18
Payer: MEDICARE

## 2018-12-19 ENCOUNTER — APPOINTMENT (OUTPATIENT)
Dept: GENERAL RADIOLOGY | Age: 68
DRG: 483 | End: 2018-12-19
Attending: ORTHOPAEDIC SURGERY
Payer: MEDICARE

## 2018-12-19 ENCOUNTER — ANESTHESIA (OUTPATIENT)
Dept: SURGERY | Age: 68
DRG: 483 | End: 2018-12-19
Payer: MEDICARE

## 2018-12-19 ENCOUNTER — HOSPITAL ENCOUNTER (INPATIENT)
Age: 68
LOS: 2 days | Discharge: NURSING, REHAB OR LONG-TERM CARE | DRG: 483 | End: 2018-12-21
Attending: ORTHOPAEDIC SURGERY | Admitting: ORTHOPAEDIC SURGERY
Payer: MEDICARE

## 2018-12-19 DIAGNOSIS — M75.101 ROTATOR CUFF TEAR ARTHROPATHY OF RIGHT SHOULDER: ICD-10-CM

## 2018-12-19 DIAGNOSIS — M12.811 ROTATOR CUFF TEAR ARTHROPATHY OF RIGHT SHOULDER: ICD-10-CM

## 2018-12-19 LAB
ABO + RH BLD: NORMAL
BLOOD GROUP ANTIBODIES SERPL: NORMAL
GLUCOSE BLD STRIP.AUTO-MCNC: 90 MG/DL (ref 65–100)
INR BLD: 1.4 (ref 0.9–1.2)
PT BLD: 16.6 SECS (ref 9.6–11.6)
SPECIMEN EXP DATE BLD: NORMAL

## 2018-12-19 PROCEDURE — 74011250636 HC RX REV CODE- 250/636

## 2018-12-19 PROCEDURE — 76942 ECHO GUIDE FOR BIOPSY: CPT | Performed by: ORTHOPAEDIC SURGERY

## 2018-12-19 PROCEDURE — 77030003602 HC NDL NRV BLK BBMI -B: Performed by: ANESTHESIOLOGY

## 2018-12-19 PROCEDURE — 73020 X-RAY EXAM OF SHOULDER: CPT

## 2018-12-19 PROCEDURE — 76010010054 HC POST OP PAIN BLOCK: Performed by: ORTHOPAEDIC SURGERY

## 2018-12-19 PROCEDURE — 77030039266 HC ADH SKN EXOFIN S2SG -A: Performed by: ORTHOPAEDIC SURGERY

## 2018-12-19 PROCEDURE — 94760 N-INVAS EAR/PLS OXIMETRY 1: CPT

## 2018-12-19 PROCEDURE — 74011000258 HC RX REV CODE- 258: Performed by: ORTHOPAEDIC SURGERY

## 2018-12-19 PROCEDURE — 74011250636 HC RX REV CODE- 250/636: Performed by: ORTHOPAEDIC SURGERY

## 2018-12-19 PROCEDURE — 77030002913 HC SUT ETHBND J&J -B: Performed by: ORTHOPAEDIC SURGERY

## 2018-12-19 PROCEDURE — 77030018836 HC SOL IRR NACL ICUM -A: Performed by: ORTHOPAEDIC SURGERY

## 2018-12-19 PROCEDURE — L3650 SO 8 ABD RESTRAINT PRE OTS: HCPCS | Performed by: ORTHOPAEDIC SURGERY

## 2018-12-19 PROCEDURE — A4565 SLINGS: HCPCS | Performed by: ORTHOPAEDIC SURGERY

## 2018-12-19 PROCEDURE — 77030002933 HC SUT MCRYL J&J -A: Performed by: ORTHOPAEDIC SURGERY

## 2018-12-19 PROCEDURE — 77030031139 HC SUT VCRL2 J&J -A: Performed by: ORTHOPAEDIC SURGERY

## 2018-12-19 PROCEDURE — 76210000017 HC OR PH I REC 1.5 TO 2 HR: Performed by: ORTHOPAEDIC SURGERY

## 2018-12-19 PROCEDURE — 77030030163 HC BN WAX J&J -A: Performed by: ORTHOPAEDIC SURGERY

## 2018-12-19 PROCEDURE — 65270000029 HC RM PRIVATE

## 2018-12-19 PROCEDURE — 74011000250 HC RX REV CODE- 250: Performed by: ORTHOPAEDIC SURGERY

## 2018-12-19 PROCEDURE — 77030037088 HC TUBE ENDOTRACH ORAL NSL COVD-A: Performed by: ANESTHESIOLOGY

## 2018-12-19 PROCEDURE — 77030039425 HC BLD LARYNG TRULITE DISP TELE -A: Performed by: ANESTHESIOLOGY

## 2018-12-19 PROCEDURE — 74011250636 HC RX REV CODE- 250/636: Performed by: ANESTHESIOLOGY

## 2018-12-19 PROCEDURE — 77030006777 HC BLD SAW OSC CNMD -B: Performed by: ORTHOPAEDIC SURGERY

## 2018-12-19 PROCEDURE — 74011000250 HC RX REV CODE- 250

## 2018-12-19 PROCEDURE — 74011250637 HC RX REV CODE- 250/637: Performed by: ORTHOPAEDIC SURGERY

## 2018-12-19 PROCEDURE — 76010000172 HC OR TIME 2.5 TO 3 HR INTENSV-TIER 1: Performed by: ORTHOPAEDIC SURGERY

## 2018-12-19 PROCEDURE — 77030018673: Performed by: ORTHOPAEDIC SURGERY

## 2018-12-19 PROCEDURE — 82962 GLUCOSE BLOOD TEST: CPT

## 2018-12-19 PROCEDURE — 77030020782 HC GWN BAIR PAWS FLX 3M -B: Performed by: ANESTHESIOLOGY

## 2018-12-19 PROCEDURE — 76060000036 HC ANESTHESIA 2.5 TO 3 HR: Performed by: ORTHOPAEDIC SURGERY

## 2018-12-19 PROCEDURE — 0RRJ00Z REPLACEMENT OF RIGHT SHOULDER JOINT WITH REVERSE BALL AND SOCKET SYNTHETIC SUBSTITUTE, OPEN APPROACH: ICD-10-PCS | Performed by: ORTHOPAEDIC SURGERY

## 2018-12-19 PROCEDURE — 77030011265 HC ELECTRD BLD HEX COVD -A: Performed by: ORTHOPAEDIC SURGERY

## 2018-12-19 PROCEDURE — 85610 PROTHROMBIN TIME: CPT

## 2018-12-19 PROCEDURE — 77030002996 HC SUT SLK J&J -A: Performed by: ORTHOPAEDIC SURGERY

## 2018-12-19 PROCEDURE — 77030003806 HC BIT DRL EXAC -E: Performed by: ORTHOPAEDIC SURGERY

## 2018-12-19 PROCEDURE — C1776 JOINT DEVICE (IMPLANTABLE): HCPCS | Performed by: ORTHOPAEDIC SURGERY

## 2018-12-19 PROCEDURE — 86901 BLOOD TYPING SEROLOGIC RH(D): CPT

## 2018-12-19 DEVICE — SCR BNE LCK GLENOSPHERE -- EQUINOXE
Type: IMPLANTABLE DEVICE | Site: SHOULDER | Status: NON-FUNCTIONAL
Removed: 2020-08-11

## 2018-12-19 DEVICE — SCR TORQUE DEFINING KIT -- EQUINOXE
Type: IMPLANTABLE DEVICE | Site: SHOULDER | Status: NON-FUNCTIONAL
Removed: 2020-08-11

## 2018-12-19 DEVICE — IMPLANTABLE DEVICE
Type: IMPLANTABLE DEVICE | Site: SHOULDER | Status: NON-FUNCTIONAL
Removed: 2020-08-11

## 2018-12-19 DEVICE — SPHERE GLEN DIA38MM REG STD SHLDR CO CHROM LOK SCR PRI REV
Type: IMPLANTABLE DEVICE | Site: SHOULDER | Status: NON-FUNCTIONAL
Removed: 2020-08-11

## 2018-12-19 DEVICE — SCR LCK CAP KIT 4.5X30MM BLU -- EQUINOXE
Type: IMPLANTABLE DEVICE | Site: SHOULDER | Status: NON-FUNCTIONAL
Removed: 2020-08-11

## 2018-12-19 DEVICE — KIT BNE SCR L26MM DIA4.5MM GLEN SHLDR ORNG COMPR LOK CAP
Type: IMPLANTABLE DEVICE | Site: SHOULDER | Status: NON-FUNCTIONAL
Removed: 2020-08-11

## 2018-12-19 DEVICE — STEM HUM DIA11MM SHLDR PRI PRESSFIT EQUINOXE: Type: IMPLANTABLE DEVICE | Site: SHOULDER | Status: FUNCTIONAL

## 2018-12-19 RX ORDER — LIDOCAINE HYDROCHLORIDE 20 MG/ML
INJECTION, SOLUTION EPIDURAL; INFILTRATION; INTRACAUDAL; PERINEURAL AS NEEDED
Status: DISCONTINUED | OUTPATIENT
Start: 2018-12-19 | End: 2018-12-19 | Stop reason: HOSPADM

## 2018-12-19 RX ORDER — POTASSIUM CHLORIDE 1.5 G/1.77G
20 POWDER, FOR SOLUTION ORAL DAILY
Status: DISCONTINUED | OUTPATIENT
Start: 2018-12-20 | End: 2018-12-21 | Stop reason: HOSPADM

## 2018-12-19 RX ORDER — PROPOFOL 10 MG/ML
INJECTION, EMULSION INTRAVENOUS AS NEEDED
Status: DISCONTINUED | OUTPATIENT
Start: 2018-12-19 | End: 2018-12-19 | Stop reason: HOSPADM

## 2018-12-19 RX ORDER — SODIUM CHLORIDE, SODIUM LACTATE, POTASSIUM CHLORIDE, CALCIUM CHLORIDE 600; 310; 30; 20 MG/100ML; MG/100ML; MG/100ML; MG/100ML
100 INJECTION, SOLUTION INTRAVENOUS CONTINUOUS
Status: DISCONTINUED | OUTPATIENT
Start: 2018-12-19 | End: 2018-12-19 | Stop reason: HOSPADM

## 2018-12-19 RX ORDER — MIDAZOLAM HYDROCHLORIDE 1 MG/ML
2 INJECTION, SOLUTION INTRAMUSCULAR; INTRAVENOUS ONCE
Status: COMPLETED | OUTPATIENT
Start: 2018-12-19 | End: 2018-12-19

## 2018-12-19 RX ORDER — FUROSEMIDE 40 MG/1
40 TABLET ORAL AS NEEDED
Status: DISCONTINUED | OUTPATIENT
Start: 2018-12-19 | End: 2018-12-19

## 2018-12-19 RX ORDER — FENTANYL CITRATE 50 UG/ML
100 INJECTION, SOLUTION INTRAMUSCULAR; INTRAVENOUS ONCE
Status: COMPLETED | OUTPATIENT
Start: 2018-12-19 | End: 2018-12-19

## 2018-12-19 RX ORDER — FOLIC ACID 1 MG/1
1 TABLET ORAL DAILY
Status: DISCONTINUED | OUTPATIENT
Start: 2018-12-20 | End: 2018-12-21 | Stop reason: HOSPADM

## 2018-12-19 RX ORDER — AMOXICILLIN 250 MG
1 CAPSULE ORAL
Status: DISCONTINUED | OUTPATIENT
Start: 2018-12-19 | End: 2018-12-21 | Stop reason: HOSPADM

## 2018-12-19 RX ORDER — PRAMIPEXOLE DIHYDROCHLORIDE 0.25 MG/1
0.25 TABLET ORAL
Status: DISCONTINUED | OUTPATIENT
Start: 2018-12-19 | End: 2018-12-21 | Stop reason: HOSPADM

## 2018-12-19 RX ORDER — SODIUM CHLORIDE 0.9 % (FLUSH) 0.9 %
5-10 SYRINGE (ML) INJECTION EVERY 8 HOURS
Status: DISCONTINUED | OUTPATIENT
Start: 2018-12-19 | End: 2018-12-21 | Stop reason: HOSPADM

## 2018-12-19 RX ORDER — METOPROLOL SUCCINATE 25 MG/1
25 TABLET, EXTENDED RELEASE ORAL DAILY
Status: DISCONTINUED | OUTPATIENT
Start: 2018-12-20 | End: 2018-12-21 | Stop reason: HOSPADM

## 2018-12-19 RX ORDER — LIDOCAINE HYDROCHLORIDE 10 MG/ML
0.1 INJECTION INFILTRATION; PERINEURAL AS NEEDED
Status: DISCONTINUED | OUTPATIENT
Start: 2018-12-19 | End: 2018-12-19 | Stop reason: HOSPADM

## 2018-12-19 RX ORDER — DIPHENHYDRAMINE HYDROCHLORIDE 50 MG/ML
12.5 INJECTION, SOLUTION INTRAMUSCULAR; INTRAVENOUS
Status: DISCONTINUED | OUTPATIENT
Start: 2018-12-19 | End: 2018-12-19 | Stop reason: HOSPADM

## 2018-12-19 RX ORDER — SODIUM CHLORIDE 0.9 % (FLUSH) 0.9 %
5-10 SYRINGE (ML) INJECTION AS NEEDED
Status: DISCONTINUED | OUTPATIENT
Start: 2018-12-19 | End: 2018-12-21 | Stop reason: HOSPADM

## 2018-12-19 RX ORDER — MELOXICAM 7.5 MG/1
15 TABLET ORAL
Status: DISCONTINUED | OUTPATIENT
Start: 2018-12-19 | End: 2018-12-21 | Stop reason: HOSPADM

## 2018-12-19 RX ORDER — CEFAZOLIN SODIUM/WATER 2 G/20 ML
2 SYRINGE (ML) INTRAVENOUS EVERY 8 HOURS
Status: COMPLETED | OUTPATIENT
Start: 2018-12-19 | End: 2018-12-20

## 2018-12-19 RX ORDER — HYDROCODONE BITARTRATE AND ACETAMINOPHEN 5; 325 MG/1; MG/1
1 TABLET ORAL
Status: DISCONTINUED | OUTPATIENT
Start: 2018-12-19 | End: 2018-12-21 | Stop reason: HOSPADM

## 2018-12-19 RX ORDER — LISINOPRIL 20 MG/1
20 TABLET ORAL DAILY
Status: DISCONTINUED | OUTPATIENT
Start: 2018-12-20 | End: 2018-12-20

## 2018-12-19 RX ORDER — ACETAMINOPHEN 500 MG
1000 TABLET ORAL ONCE
Status: DISCONTINUED | OUTPATIENT
Start: 2018-12-19 | End: 2018-12-19 | Stop reason: HOSPADM

## 2018-12-19 RX ORDER — ONDANSETRON 2 MG/ML
INJECTION INTRAMUSCULAR; INTRAVENOUS AS NEEDED
Status: DISCONTINUED | OUTPATIENT
Start: 2018-12-19 | End: 2018-12-19 | Stop reason: HOSPADM

## 2018-12-19 RX ORDER — HYDROMORPHONE HYDROCHLORIDE 2 MG/ML
0.5 INJECTION, SOLUTION INTRAMUSCULAR; INTRAVENOUS; SUBCUTANEOUS
Status: DISCONTINUED | OUTPATIENT
Start: 2018-12-19 | End: 2018-12-19 | Stop reason: HOSPADM

## 2018-12-19 RX ORDER — DEXAMETHASONE SODIUM PHOSPHATE 4 MG/ML
INJECTION, SOLUTION INTRA-ARTICULAR; INTRALESIONAL; INTRAMUSCULAR; INTRAVENOUS; SOFT TISSUE
Status: COMPLETED | OUTPATIENT
Start: 2018-12-19 | End: 2018-12-19

## 2018-12-19 RX ORDER — OXYBUTYNIN CHLORIDE 5 MG/1
5 TABLET, EXTENDED RELEASE ORAL DAILY
Status: DISCONTINUED | OUTPATIENT
Start: 2018-12-20 | End: 2018-12-21 | Stop reason: HOSPADM

## 2018-12-19 RX ORDER — ACETAMINOPHEN 325 MG/1
650 TABLET ORAL
Status: DISCONTINUED | OUTPATIENT
Start: 2018-12-19 | End: 2018-12-21 | Stop reason: HOSPADM

## 2018-12-19 RX ORDER — LIDOCAINE HYDROCHLORIDE AND EPINEPHRINE 10; 10 MG/ML; UG/ML
INJECTION, SOLUTION INFILTRATION; PERINEURAL AS NEEDED
Status: DISCONTINUED | OUTPATIENT
Start: 2018-12-19 | End: 2018-12-19 | Stop reason: HOSPADM

## 2018-12-19 RX ORDER — ZOLPIDEM TARTRATE 5 MG/1
5 TABLET ORAL
Status: DISCONTINUED | OUTPATIENT
Start: 2018-12-19 | End: 2018-12-21 | Stop reason: HOSPADM

## 2018-12-19 RX ORDER — ALLOPURINOL 300 MG/1
300 TABLET ORAL DAILY
Status: DISCONTINUED | OUTPATIENT
Start: 2018-12-20 | End: 2018-12-21 | Stop reason: HOSPADM

## 2018-12-19 RX ORDER — GLYCOPYRROLATE 0.2 MG/ML
INJECTION INTRAMUSCULAR; INTRAVENOUS AS NEEDED
Status: DISCONTINUED | OUTPATIENT
Start: 2018-12-19 | End: 2018-12-19 | Stop reason: HOSPADM

## 2018-12-19 RX ORDER — BUSPIRONE HYDROCHLORIDE 5 MG/1
5 TABLET ORAL DAILY
Status: DISCONTINUED | OUTPATIENT
Start: 2018-12-20 | End: 2018-12-21 | Stop reason: HOSPADM

## 2018-12-19 RX ORDER — PANTOPRAZOLE SODIUM 40 MG/1
40 TABLET, DELAYED RELEASE ORAL
Status: DISCONTINUED | OUTPATIENT
Start: 2018-12-20 | End: 2018-12-21 | Stop reason: HOSPADM

## 2018-12-19 RX ORDER — MIDAZOLAM HYDROCHLORIDE 1 MG/ML
2 INJECTION, SOLUTION INTRAMUSCULAR; INTRAVENOUS
Status: DISCONTINUED | OUTPATIENT
Start: 2018-12-19 | End: 2018-12-19 | Stop reason: HOSPADM

## 2018-12-19 RX ORDER — INDAPAMIDE 2.5 MG/1
2.5 TABLET, FILM COATED ORAL DAILY
Status: DISCONTINUED | OUTPATIENT
Start: 2018-12-20 | End: 2018-12-21 | Stop reason: HOSPADM

## 2018-12-19 RX ORDER — AMIODARONE HYDROCHLORIDE 200 MG/1
200 TABLET ORAL DAILY
Status: DISCONTINUED | OUTPATIENT
Start: 2018-12-20 | End: 2018-12-21 | Stop reason: HOSPADM

## 2018-12-19 RX ORDER — MONTELUKAST SODIUM 10 MG/1
10 TABLET ORAL
Status: DISCONTINUED | OUTPATIENT
Start: 2018-12-19 | End: 2018-12-21 | Stop reason: HOSPADM

## 2018-12-19 RX ORDER — SODIUM CHLORIDE 0.9 % (FLUSH) 0.9 %
5-10 SYRINGE (ML) INJECTION EVERY 8 HOURS
Status: DISCONTINUED | OUTPATIENT
Start: 2018-12-19 | End: 2018-12-19

## 2018-12-19 RX ORDER — DEXTROSE MONOHYDRATE AND SODIUM CHLORIDE 5; .9 G/100ML; G/100ML
100 INJECTION, SOLUTION INTRAVENOUS CONTINUOUS
Status: DISCONTINUED | OUTPATIENT
Start: 2018-12-19 | End: 2018-12-21 | Stop reason: HOSPADM

## 2018-12-19 RX ORDER — SODIUM CHLORIDE 0.9 % (FLUSH) 0.9 %
5-10 SYRINGE (ML) INJECTION AS NEEDED
Status: DISCONTINUED | OUTPATIENT
Start: 2018-12-19 | End: 2018-12-19

## 2018-12-19 RX ORDER — GABAPENTIN 300 MG/1
600 CAPSULE ORAL
Status: DISCONTINUED | OUTPATIENT
Start: 2018-12-19 | End: 2018-12-21 | Stop reason: HOSPADM

## 2018-12-19 RX ORDER — OXYCODONE HYDROCHLORIDE 5 MG/1
5 TABLET ORAL
Status: DISCONTINUED | OUTPATIENT
Start: 2018-12-19 | End: 2018-12-19 | Stop reason: HOSPADM

## 2018-12-19 RX ORDER — NEOSTIGMINE METHYLSULFATE 1 MG/ML
INJECTION INTRAVENOUS AS NEEDED
Status: DISCONTINUED | OUTPATIENT
Start: 2018-12-19 | End: 2018-12-19 | Stop reason: HOSPADM

## 2018-12-19 RX ORDER — ROCURONIUM BROMIDE 10 MG/ML
INJECTION, SOLUTION INTRAVENOUS AS NEEDED
Status: DISCONTINUED | OUTPATIENT
Start: 2018-12-19 | End: 2018-12-19 | Stop reason: HOSPADM

## 2018-12-19 RX ORDER — OXYCODONE HYDROCHLORIDE 15 MG/1
15 TABLET ORAL
Status: DISCONTINUED | OUTPATIENT
Start: 2018-12-19 | End: 2018-12-21 | Stop reason: HOSPADM

## 2018-12-19 RX ORDER — CYCLOBENZAPRINE HCL 10 MG
10 TABLET ORAL 2 TIMES DAILY
Status: DISCONTINUED | OUTPATIENT
Start: 2018-12-19 | End: 2018-12-21 | Stop reason: HOSPADM

## 2018-12-19 RX ORDER — ONDANSETRON 2 MG/ML
4 INJECTION INTRAMUSCULAR; INTRAVENOUS
Status: DISCONTINUED | OUTPATIENT
Start: 2018-12-19 | End: 2018-12-21 | Stop reason: HOSPADM

## 2018-12-19 RX ORDER — WARFARIN 2.5 MG/1
2.5 TABLET ORAL
Status: DISCONTINUED | OUTPATIENT
Start: 2018-12-19 | End: 2018-12-21 | Stop reason: HOSPADM

## 2018-12-19 RX ORDER — ASPIRIN 81 MG/1
81 TABLET ORAL DAILY
Status: DISCONTINUED | OUTPATIENT
Start: 2018-12-20 | End: 2018-12-21 | Stop reason: HOSPADM

## 2018-12-19 RX ORDER — FLUMAZENIL 0.1 MG/ML
0.2 INJECTION INTRAVENOUS
Status: DISCONTINUED | OUTPATIENT
Start: 2018-12-19 | End: 2018-12-19 | Stop reason: HOSPADM

## 2018-12-19 RX ORDER — OXYCODONE HYDROCHLORIDE 5 MG/1
10 TABLET ORAL
Status: DISCONTINUED | OUTPATIENT
Start: 2018-12-19 | End: 2018-12-19 | Stop reason: HOSPADM

## 2018-12-19 RX ORDER — FLUOXETINE HYDROCHLORIDE 20 MG/1
20 CAPSULE ORAL DAILY
Status: DISCONTINUED | OUTPATIENT
Start: 2018-12-20 | End: 2018-12-21 | Stop reason: HOSPADM

## 2018-12-19 RX ORDER — FUROSEMIDE 40 MG/1
40 TABLET ORAL
Status: DISCONTINUED | OUTPATIENT
Start: 2018-12-19 | End: 2018-12-21 | Stop reason: HOSPADM

## 2018-12-19 RX ORDER — NALOXONE HYDROCHLORIDE 0.4 MG/ML
0.2 INJECTION, SOLUTION INTRAMUSCULAR; INTRAVENOUS; SUBCUTANEOUS AS NEEDED
Status: DISCONTINUED | OUTPATIENT
Start: 2018-12-19 | End: 2018-12-19 | Stop reason: HOSPADM

## 2018-12-19 RX ORDER — HYDROMORPHONE HYDROCHLORIDE 2 MG/ML
1 INJECTION, SOLUTION INTRAMUSCULAR; INTRAVENOUS; SUBCUTANEOUS
Status: ACTIVE | OUTPATIENT
Start: 2018-12-19 | End: 2018-12-20

## 2018-12-19 RX ADMIN — MIDAZOLAM 1 MG: 1 INJECTION INTRAMUSCULAR; INTRAVENOUS at 11:06

## 2018-12-19 RX ADMIN — LIDOCAINE HYDROCHLORIDE 0.1 ML: 10 INJECTION, SOLUTION INFILTRATION; PERINEURAL at 09:54

## 2018-12-19 RX ADMIN — NEOSTIGMINE METHYLSULFATE 3 MG: 1 INJECTION INTRAVENOUS at 14:53

## 2018-12-19 RX ADMIN — ONDANSETRON 4 MG: 2 INJECTION INTRAMUSCULAR; INTRAVENOUS at 12:36

## 2018-12-19 RX ADMIN — Medication 5 ML: at 17:34

## 2018-12-19 RX ADMIN — DEXAMETHASONE SODIUM PHOSPHATE 4 MG: 4 INJECTION, SOLUTION INTRA-ARTICULAR; INTRALESIONAL; INTRAMUSCULAR; INTRAVENOUS; SOFT TISSUE at 11:09

## 2018-12-19 RX ADMIN — WARFARIN SODIUM 2.5 MG: 2.5 TABLET ORAL at 20:37

## 2018-12-19 RX ADMIN — Medication 2 G: at 21:20

## 2018-12-19 RX ADMIN — CYCLOBENZAPRINE HYDROCHLORIDE 10 MG: 10 TABLET, FILM COATED ORAL at 20:37

## 2018-12-19 RX ADMIN — PROPOFOL 140 MG: 10 INJECTION, EMULSION INTRAVENOUS at 12:19

## 2018-12-19 RX ADMIN — DEXTROSE MONOHYDRATE AND SODIUM CHLORIDE 75 ML/HR: 5; .9 INJECTION, SOLUTION INTRAVENOUS at 17:00

## 2018-12-19 RX ADMIN — Medication 3 MG: at 12:17

## 2018-12-19 RX ADMIN — SODIUM CHLORIDE, SODIUM LACTATE, POTASSIUM CHLORIDE, AND CALCIUM CHLORIDE 100 ML/HR: 600; 310; 30; 20 INJECTION, SOLUTION INTRAVENOUS at 15:09

## 2018-12-19 RX ADMIN — PRAMIPEXOLE DIHYDROCHLORIDE 0.25 MG: 0.25 TABLET ORAL at 20:37

## 2018-12-19 RX ADMIN — GLYCOPYRROLATE 0.4 MG: 0.2 INJECTION INTRAMUSCULAR; INTRAVENOUS at 14:53

## 2018-12-19 RX ADMIN — LIDOCAINE HYDROCHLORIDE 40 MG: 20 INJECTION, SOLUTION EPIDURAL; INFILTRATION; INTRACAUDAL; PERINEURAL at 12:19

## 2018-12-19 RX ADMIN — SODIUM CHLORIDE, SODIUM LACTATE, POTASSIUM CHLORIDE, AND CALCIUM CHLORIDE 100 ML/HR: 600; 310; 30; 20 INJECTION, SOLUTION INTRAVENOUS at 09:35

## 2018-12-19 RX ADMIN — FENTANYL CITRATE 50 MCG: 50 INJECTION INTRAMUSCULAR; INTRAVENOUS at 11:06

## 2018-12-19 RX ADMIN — ROCURONIUM BROMIDE 50 MG: 10 INJECTION, SOLUTION INTRAVENOUS at 12:19

## 2018-12-19 NOTE — PROGRESS NOTES
Warfarin dosing per pharmacist    [place] Vines is a 77 y.o. female. @[facility](37)@    @[facility](00)@    Indication:  atrial fibrillation    Goal INR:  2 - 3    Home dose:  2.5 mg HS    Risk factors or significant drug interactions:  none     Other anticoagulants:  none    Daily Monitoring  Date  INR     Warfarin dose HGB              Notes  12/19  1.4  2.5   12.6    Will continue with Warfarin 2.5 mg every evening.           Thank you,  Francesco Mendoza PharmD, BCPS

## 2018-12-19 NOTE — PROGRESS NOTES
Warfarin dosing per pharmacist    Citlali Vigil is a 77 y.o. female. @[facility](07)@    @[facility](57)@    Indication:  atrial fibrillation    Goal INR:  2 - 3    Home dose:  2.5 mg HS    Risk factors or significant drug interactions:  none     Other anticoagulants:  none    Daily Monitoring  Date  INR     Warfarin dose HGB              Notes  12/19  1.4  2.5   12.6    Will continue with Warfarin 2.5 mg every evening.           Thank you,  Ernie StylesD, BCPS

## 2018-12-19 NOTE — ANESTHESIA POSTPROCEDURE EVALUATION
Procedure(s):  RIGHT SHOULDER ARTHROPLASTY TOTAL REVERSE  / Helena Peter / INTERSCALENE IP CODE ONLY. Anesthesia Post Evaluation      Multimodal analgesia: multimodal analgesia used between 6 hours prior to anesthesia start to PACU discharge  Patient location during evaluation: PACU  Patient participation: complete - patient participated  Level of consciousness: awake and alert  Pain management: adequate  Airway patency: patent  Anesthetic complications: no  Cardiovascular status: acceptable and hemodynamically stable  Respiratory status: acceptable  Hydration status: acceptable  Comments: Some hypotension in PACU relieved by IM ephedrine. Patient awake and talking in no distress and asymptomatic. ISB working well. Visit Vitals  BP 90/53   Pulse 84   Temp 36.3 °C (97.4 °F)   Resp 16   Ht 5' 2.01\" (1.575 m)   Wt 129.3 kg (285 lb)   SpO2 95%   Breastfeeding?  No   BMI 52.11 kg/m²

## 2018-12-19 NOTE — PROGRESS NOTES
Pt resting well in bed with no c/os. Pt wiggles all finger well to right hand and has strong radial pulses but denies any pain at present. Family at bedside. Assessment unchanged. inst pt / family to call for any needs.

## 2018-12-19 NOTE — PERIOP NOTES
TRANSFER - OUT REPORT:    Verbal report given to Joelle Norman Rn on Nadine Pittman  being transferred to ortho room 335 for routine post - op       Report consisted of patients Situation, Background, Assessment and   Recommendations(SBAR). Information from the following report(s) SBAR, OR Summary, Intake/Output and MAR was reviewed with the receiving nurse. Lines:   Peripheral IV 12/19/18 Left Hand (Active)   Site Assessment Clean, dry, & intact 12/19/2018  4:00 PM   Phlebitis Assessment 0 12/19/2018  4:00 PM   Infiltration Assessment 0 12/19/2018  4:00 PM   Dressing Status Clean, dry, & intact; Occlusive 12/19/2018  4:00 PM   Dressing Type Transparent;Tape 12/19/2018  4:00 PM   Hub Color/Line Status Green; Infusing;Patent 12/19/2018  4:00 PM        Opportunity for questions and clarification was provided.       Patient transported with:   O2 @ 4 liters

## 2018-12-19 NOTE — ANESTHESIA PROCEDURE NOTES
Peripheral Block    Start time: 12/19/2018 11:06 AM  End time: 12/19/2018 11:09 AM  Performed by: Concha Purvis MD  Authorized by: Concha Purvis MD       Pre-procedure: Indications: at surgeon's request and post-op pain management    Preanesthetic Checklist: patient identified, risks and benefits discussed, site marked, timeout performed, anesthesia consent given and patient being monitored      Block Type:   Block Type:   Interscalene  Laterality:  Right  Monitoring:  Continuous pulse ox, frequent vital sign checks, heart rate, responsive to questions and oxygen  Injection Technique:  Single shot  Procedures: ultrasound guided    Prep: chlorhexidine    Location:  Interscalene  Needle Type:  Stimuplex  Needle Gauge:  22 G  Needle Localization:  Anatomical landmarks, infiltration and ultrasound guidance    Assessment:  Number of attempts:  1  Injection Assessment:  Incremental injection every 5 mL, negative aspiration for CSF, local visualized surrounding nerve on ultrasound, negative aspiration for blood, no intravascular symptoms, no paresthesia and ultrasound image on chart  Patient tolerance:  Patient tolerated the procedure well with no immediate complications

## 2018-12-19 NOTE — ANESTHESIA PREPROCEDURE EVALUATION
Anesthetic History     PONV          Review of Systems / Medical History  Patient summary reviewed and pertinent labs reviewed    Pulmonary  Within defined limits                 Neuro/Psych         Psychiatric history    Comments: Chronic pain syndrome-long term PO narcotics Cardiovascular    Hypertension: well controlled        Dysrhythmias (on coumadin - last dose 5d ago) : atrial fibrillation      Exercise tolerance: <4 METS     GI/Hepatic/Renal     GERD: well controlled           Endo/Other    Diabetes: well controlled, type 2    Morbid obesity and arthritis     Other Findings            Physical Exam    Airway  Mallampati: I  TM Distance: > 6 cm  Neck ROM: normal range of motion   Mouth opening: Normal     Cardiovascular    Rhythm: regular  Rate: normal         Dental    Dentition: Full upper dentures     Pulmonary  Breath sounds clear to auscultation               Abdominal  GI exam deferred       Other Findings            Anesthetic Plan    ASA: 3  Anesthesia type: general - interscalene block      Post-op pain plan if not by surgeon: peripheral nerve block single    Induction: Intravenous  Anesthetic plan and risks discussed with: Patient and Spouse

## 2018-12-19 NOTE — PROGRESS NOTES
TRANSFER - IN REPORT:    Verbal report received from Taylor Millardrn(name) on Denise Sees  being received from [facility]) for routine progression of care      Report consisted of patients Situation, Background, Assessment and   Recommendations(SBAR). Information from the following report(s) SBAR, Procedure Summary, Intake/Output, MAR and Recent Results was reviewed with the receiving nurse. Opportunity for questions and clarification was provided. Assessment completed upon patients arrival to unit and care assumed.

## 2018-12-20 LAB
HCT VFR BLD AUTO: 27.5 % (ref 35.8–46.3)
HGB BLD-MCNC: 8.5 G/DL (ref 11.7–15.4)
INR PPP: 1.5
PROTHROMBIN TIME: 18.6 SEC (ref 11.7–14.5)

## 2018-12-20 PROCEDURE — 99221 1ST HOSP IP/OBS SF/LOW 40: CPT | Performed by: PHYSICAL MEDICINE & REHABILITATION

## 2018-12-20 PROCEDURE — 74011250636 HC RX REV CODE- 250/636: Performed by: ORTHOPAEDIC SURGERY

## 2018-12-20 PROCEDURE — 85018 HEMOGLOBIN: CPT

## 2018-12-20 PROCEDURE — 74011250637 HC RX REV CODE- 250/637: Performed by: ORTHOPAEDIC SURGERY

## 2018-12-20 PROCEDURE — 86580 TB INTRADERMAL TEST: CPT | Performed by: ORTHOPAEDIC SURGERY

## 2018-12-20 PROCEDURE — 97110 THERAPEUTIC EXERCISES: CPT

## 2018-12-20 PROCEDURE — 74011000258 HC RX REV CODE- 258: Performed by: ORTHOPAEDIC SURGERY

## 2018-12-20 PROCEDURE — 74011000302 HC RX REV CODE- 302: Performed by: ORTHOPAEDIC SURGERY

## 2018-12-20 PROCEDURE — 97161 PT EVAL LOW COMPLEX 20 MIN: CPT

## 2018-12-20 PROCEDURE — 65270000029 HC RM PRIVATE

## 2018-12-20 PROCEDURE — 85610 PROTHROMBIN TIME: CPT

## 2018-12-20 PROCEDURE — 36415 COLL VENOUS BLD VENIPUNCTURE: CPT

## 2018-12-20 PROCEDURE — 97530 THERAPEUTIC ACTIVITIES: CPT

## 2018-12-20 RX ADMIN — INDAPAMIDE 2.5 MG: 2.5 TABLET, FILM COATED ORAL at 09:00

## 2018-12-20 RX ADMIN — HYDROCODONE BITARTRATE AND ACETAMINOPHEN 1 TABLET: 5; 325 TABLET ORAL at 17:42

## 2018-12-20 RX ADMIN — AMIODARONE HYDROCHLORIDE 200 MG: 200 TABLET ORAL at 08:55

## 2018-12-20 RX ADMIN — FOLIC ACID 1 MG: 1 TABLET ORAL at 08:54

## 2018-12-20 RX ADMIN — Medication 2 G: at 14:44

## 2018-12-20 RX ADMIN — Medication 2 G: at 05:34

## 2018-12-20 RX ADMIN — WARFARIN SODIUM 2.5 MG: 2.5 TABLET ORAL at 17:18

## 2018-12-20 RX ADMIN — TUBERCULIN PURIFIED PROTEIN DERIVATIVE 5 UNITS: 5 INJECTION, SOLUTION INTRADERMAL at 14:42

## 2018-12-20 RX ADMIN — PANTOPRAZOLE SODIUM 40 MG: 40 TABLET, DELAYED RELEASE ORAL at 05:34

## 2018-12-20 RX ADMIN — POTASSIUM CHLORIDE 20 MEQ: 1.5 POWDER, FOR SOLUTION ORAL at 08:55

## 2018-12-20 RX ADMIN — DEXTROSE MONOHYDRATE AND SODIUM CHLORIDE 75 ML/HR: 5; .9 INJECTION, SOLUTION INTRAVENOUS at 00:16

## 2018-12-20 RX ADMIN — FLUOXETINE 20 MG: 20 CAPSULE ORAL at 08:55

## 2018-12-20 RX ADMIN — CYCLOBENZAPRINE HYDROCHLORIDE 10 MG: 10 TABLET, FILM COATED ORAL at 17:18

## 2018-12-20 RX ADMIN — OXYBUTYNIN CHLORIDE 5 MG: 5 TABLET, EXTENDED RELEASE ORAL at 08:55

## 2018-12-20 RX ADMIN — GABAPENTIN 600 MG: 300 CAPSULE ORAL at 21:14

## 2018-12-20 RX ADMIN — SODIUM CHLORIDE 500 ML: 900 INJECTION, SOLUTION INTRAVENOUS at 01:00

## 2018-12-20 RX ADMIN — BUSPIRONE HYDROCHLORIDE 5 MG: 5 TABLET ORAL at 08:55

## 2018-12-20 RX ADMIN — PRAMIPEXOLE DIHYDROCHLORIDE 0.25 MG: 0.25 TABLET ORAL at 21:14

## 2018-12-20 RX ADMIN — CYCLOBENZAPRINE HYDROCHLORIDE 10 MG: 10 TABLET, FILM COATED ORAL at 08:55

## 2018-12-20 RX ADMIN — Medication 5 ML: at 14:44

## 2018-12-20 RX ADMIN — ALLOPURINOL 300 MG: 300 TABLET ORAL at 08:55

## 2018-12-20 RX ADMIN — ASPIRIN 81 MG: 81 TABLET, COATED ORAL at 08:55

## 2018-12-20 NOTE — PROGRESS NOTES
Informed nurse supervisor, Darlyn Marcelino, regarding MEWS score and low BP. Nurse supervisor arrived at the room a few minutes later. Will keep updated. Called back by Dr. Elena Shane. Dr was informed of pt BP and all vital signs. Per dr. Bere Mosley received for 500ml bolus to be given over 20 minutes. And then to continue fluids at 100 ml/hr. Orders put in and bolus started.    Will continue to monitor BP

## 2018-12-20 NOTE — PROGRESS NOTES
Pt  IV fluids continue due to low/ b/p. Pt denies any s/s of hypotension. Pt in bed with family at bedside. No c/os. inst pt to call for any needs.

## 2018-12-20 NOTE — PROGRESS NOTES
Pt in bed watching tv  Alert and oriented x4  Pt has numbness to RUE with limited movement  Surgical incision is clean, dry and intact with topical adhesive   No c/o pain at this time   Will continue to monitor 0

## 2018-12-20 NOTE — PROGRESS NOTES
Pt doing well. Pt in bed resting. Pt states she feels good. No c/o pain at this time. IV fluids running now due to low BP earlier in the night.  Will continue to monitor

## 2018-12-20 NOTE — PROGRESS NOTES
Alert and oriented  Wound area is benign with no obvious infection  Acute blood loss anemia does not require transfusion today but warrants recheck tomorrow  Denies chest pain or dyspnea  No inordinate pain  Pain management improving. Needs continued hospital stay for parenteral pain meds as needed. I recommend at least twelve hours without need for parenteral narcotics for pain management. Needs continued inpatient physiotherapy  Hydration status acceptable.  Wean IV fluids as able today

## 2018-12-20 NOTE — PROGRESS NOTES
Called by primary RN, Waymond Kayser, regarding elevated mews score. Pt. Resting in bed in trendelenburg position. Alert and oriented x3. Complaints of being hot, but no other complaints. Removed two blankets from patient per her request.  Bolus started by primary RN. BP rechecked by Waymond Kayser with slight improvement. Primary RN will continue to monitor closely and contact MD if no improvement of BP.

## 2018-12-20 NOTE — PROGRESS NOTES
Problem: Mobility Impaired (Adult and Pediatric)  Goal: *Acute Goals and Plan of Care (Insert Text)  STG:  (1.)Ms. Galeana Hence will move from supine to sit and sit to supine  with MINIMAL ASSIST within 4-7 treatment day(s). (2.)Ms. Galeana Hence will transfer from bed to chair and chair to bed with MINIMAL ASSIST of 2 using the least restrictive device within 4-7 treatment day(s). (3.)Ms. Galeana Hence will ambulate with MINIMAL ASSIST of 2 for 50 feet with the least restrictive device within 4-7 treatment day(s). LTG:  (1.)Ms. Galeana Hence will move from supine to sit and sit to supine  in bed with CONTACT GUARD ASSIST within 7-14 treatment day(s). (2.)Ms. Galeana Hence will transfer from bed to chair and chair to bed with MINIMAL ASSIST using the least restrictive device within 7-14 treatment day(s). (3.)Ms. Galeana Hence will ambulate with MINIMAL ASSIST for 100 feet with the least restrictive device within 7-14 treatment day(s). ________________________________________________________________________________________________    PHYSICAL THERAPY: Initial Assessment, Treatment Day: Day of Assessment, AM 12/20/2018  INPATIENT: Hospital Day: 2  [place] / [place] / Product Type: Medicare /      NAME/AGE/GENDER: Sacha Ennis is a 77 y.o. female   PRIMARY DIAGNOSIS: Rotator cuff tear arthropathy of right shoulder [M75.101, M12.811] <principal problem not specified> <principal problem not specified>  Procedure(s) (LRB):  RIGHT SHOULDER ARTHROPLASTY TOTAL REVERSE  / Mauricio Pattee / INTERSCALENE IP CODE ONLY (Right)  1 Day Post-Op  ICD-10: Treatment Diagnosis:   · Pain in Right Shoulder (M25.511)  · Stiffness of Right Shoulder, Not elsewhere classified (M25.611)  · Generalized Muscle Weakness (M62.81)  · Difficulty in walking, Not elsewhere classified (R26.2)   Precaution/Allergies:  Patient has no known allergies.       ASSESSMENT:     Ms. Galeana Hence presents supine on contact s/p right shoulder arthroplasty reverse. At home she was independent with gait but limited to short distances due to right knee pain (states she has a torn meniscus but has been told she is not a candidate for surgery). Pt needed significant assistance to move from supine to sit-now that her right upper extremity has had surgery and she was not able to rely on her right arm for mobility. Also needed moderate assist of one to 2 people to move from sit to stand and to take a few steps by the bedside. Helped her lay back down with assist of 2 people due to her low BP and RN wanted us to be a little cautious this morning. She did well with her shoulder exercises with verbal cues although admits her shoulder still may be a little numb. Talked with SW about options for rehab, pt would not be safe to be up without assistance. Pt left in supine with ice on shoulder and needs in reach. Hope to progress at next treatment session. This section established at most recent assessment   PROBLEM LIST (Impairments causing functional limitations):  1. Decreased [place] with Bed Mobility  2. Decreased [place] with Transfers  3. Decreased [place] with Ambulation   4. Decreased [place] with shoulder HEP   INTERVENTIONS PLANNED: (Benefits and precautions of physical therapy have been discussed with the patient.)  1. Bed Mobility Training  2. Transfer Training  3. Gait Training  4. Therapeutic Exercises per MD orders  5. Modalities for Pain     TREATMENT PLAN: Frequency/Duration: twice daily for duration of hospital stay  Rehabilitation Potential For Stated Goals: Good     RECOMMENDED REHABILITATION/EQUIPMENT: (at time of discharge pending progress): Continue Skilled Therapy, Rehab and Discussed with Case Management.               HISTORY:   History of Present Injury/Illness (Reason for Referral):  Pt s/p above procedure on 12/20/18  Past Medical History/Comorbidities:   Ms. Letty Murry  has a past medical history of Anxiety, Arthritis, Atrial fibrillation ([facility] [address] Cancer ([facility] [address] Chronic pain, Diabetes ([facility] [address] Dysphagia, Edema, GERD (gastroesophageal reflux disease), Gout, Hypertension, Morbid obesity ([facility] [address] Murmur, cardiac, Nausea & vomiting, Overactive bladder, Psychiatric disorder, Restless leg syndrome, UTI (urinary tract infection), and Venous stasis. Ms. Barry Munoz  has a past surgical history that includes hx cholecystectomy; hx orthopaedic (Right); hx heent (09/2016); hx tonsillectomy; hx cataract removal (Bilateral); hx hysterectomy; hx tubal ligation; pr breast surgery procedure unlisted; hx orthopaedic; hx heent; RIGHT ARTHROSCOPIC BICEPS TENOTOMY and ACROMIOPLASTY (Right, 1/5/2018); and LEFT SHOULDER ARTHROPLASTY MOISES/ EXACTECH WITH CTA HEAD (Left, 10/18/2016). Social History/Living Environment:   Home Environment: Private residence  Wheelchair Ramp: Yes  One/Two Story Residence: One story  Living Alone: No  Support Systems: Family member(s), Spouse/Significant Other/Partner  Patient Expects to be Discharged to[de-identified] Private residence  Current DME Used/Available at Home: Letty Legato, straight, Walker  Tub or Shower Type: Tub/Shower combination  Prior Level of Function/Work/Activity:  Pt living at home with spouse, limited household ambulator. Number of Personal Factors/Comorbidities that affect the Plan of Care: 1-2: MODERATE COMPLEXITY   EXAMINATION:   Most Recent Physical Functioning:   Gross Assessment:     RUE AROM  R Shoulder Flexion: 45(approximate)            Posture:  Posture (WDL): Exceptions to WDL  Posture Assessment: Forward head, Rounded shoulders  Balance:  Sitting: Intact  Standing: Impaired;Pull to stand; With support  Standing - Static: Constant support;Good  Standing - Dynamic : Fair Bed Mobility:  Supine to Sit: Moderate assistance; Additional time; Other (comment)(head of bed elevated )  Sit to Supine: Moderate assistance; Additional time;Assist x2  Wheelchair Mobility:     Transfers:  Sit to Stand: Moderate assistance; Additional time;Other (comment)(plus CGA of one more)  Stand to Sit: Moderate assistance  Gait:     Speed/Nancy: Pace decreased (<100 feet/min); Shuffled  Step Length: Left shortened;Right shortened  Gait Abnormalities: Antalgic;Decreased step clearance;Shuffling gait; Step to gait;Trunk sway increased  Distance (ft): 3 Feet (ft)  Assistive Device: Walker, rolling  Ambulation - Level of Assistance: Minimal assistance; Other (comment)(of 1 to 2)  Interventions: Safety awareness training;Verbal cues      Body Structures Involved:  1. Joints  2. Muscles Body Functions Affected:  1. Movement Related Activities and Participation Affected:  1. Mobility  2. Self Care   Number of elements that affect the Plan of Care: 4+: HIGH COMPLEXITY   CLINICAL PRESENTATION:   Presentation: Stable and uncomplicated: LOW COMPLEXITY   CLINICAL DECISION MAKIN[address] [place] [id number] AM-PAC 6 Clicks   Basic Mobility Inpatient Short Form  How much difficulty does the patient currently have. .. Unable A Lot A Little None   1. Turning over in bed (including adjusting bedclothes, sheets and blankets)? [] 1   [x] 2   [] 3   [] 4   2. Sitting down on and standing up from a chair with arms ( e.g., wheelchair, bedside commode, etc.)   [] 1   [x] 2   [] 3   [] 4   3. Moving from lying on back to sitting on the side of the bed? [] 1   [x] 2   [] 3   [] 4   How much help from another person does the patient currently need. .. Total A Lot A Little None   4. Moving to and from a bed to a chair (including a wheelchair)? [] 1   [x] 2   [] 3   [] 4   5. Need to walk in hospital room? [] 1   [x] 2   [] 3   [] 4   6. Climbing 3-5 steps with a railing? [] 1   [x] 2   [] 3   [] 4   © , Trustees of [address] [place] [id number], under license to [employer] All rights reserved      Score:  Initial: 12 Most Recent: X (Date: -- )    Interpretation of Tool:  Represents activities that are increasingly more difficult (i.e. Bed mobility, Transfers, Gait).    Score 24 23 22-20 19-15 14-10 9-7 6     Modifier CH CI CJ CK CL CM CN      ? Mobility - Walking and Moving Around:     - CURRENT STATUS: CL - 60%-79% impaired, limited or restricted    - GOAL STATUS: CJ - 20%-39% impaired, limited or restricted    - D/C STATUS:  ---------------To be determined---------------  [place] / [place] / Product Type: Medicare /      Medical Necessity:     · Patient is expected to demonstrate progress in strength, range of motion, balance, coordination and functional technique to decrease assistance required with functional mobility and shoulder exercises. Reason for Services/Other Comments:  · Patient continues to require skilled intervention due to inability to complete functional mobility and shoulder exercises independently. Use of outcome tool(s) and clinical judgement create a POC that gives a: Clear prediction of patient's progress: LOW COMPLEXITY            TREATMENT:   (In addition to Assessment/Re-Assessment sessions the following treatments were rendered)   Pre-treatment Symptoms/Complaints:  Knee pain  Pain: Initial: having knee pain, but did not rate     Post Session:  Sore from moving     Therapeutic Exercise: (15 Minutes):  Exercises per grid below to improve mobility and strength. Required minimal verbal and manual cues to promote proper body alignment and promote proper body posture. Progressed repetitions as indicated.      Date:  8/20/18 Date:   Date:     ACTIVITY/EXERCISE AM PM AM PM AM PM   Gripping 10        Wrist Flexion/Extension 10        Wrist Ulnar/Radial Deviation         Pronation/Supination 10        Elbow Flexion/Extension 10        Shoulder Flexion/Extension 10 P        Shoulder AB/ADduction         Shoulder IR/ER         Pulleys         Pendulums         Shrugs 10        Isometric:                 Flexion 5        Extension 5        ABduction         ADduction         Biceps/Triceps                  B = bilateral; AA = active assistive; A = active; P = passive  Education:  [x]  Home Exercises  []  Sling Application   []  Movement Precautions   []  Pulleys   []  Use of Ice   []  Other:   Treatment/Session Assessment:    · Response to Treatment:  Tolerated fair, increased knee pain limiting mobility  · Interdisciplinary Collaboration:   o Registered Nurse  o Rehabilitation Attendant  · After treatment position/precautions:   o Supine in bed  o Bed/Chair-wheels locked  o Bed in low position  o Call light within reach  o Family at bedside   · Compliance with Program/Exercises: compliant all of the time. · Recommendations/Intent for next treatment session:  Treatment next visit will focus on increasing Ms. Lawson's independence with bed mobility, transfers, gait training, strength/ROM exercises, modalities for pain, and patient education.    Total Treatment Duration:  PT Patient Time In/Time Out  Time In: 0945  Time Out: [id number] Samra Lujan, PT

## 2018-12-20 NOTE — DISCHARGE SUMMARY
REHABILITATION DISCHARGE SUMMARY     Patient: Alex Tellez MRN: [id number]  SSN: [id number]    YOB: 1950  Age: 77 y.o. Sex: female      Date: 12/20/2018  Admit Date: 12/19/2018  Discharge Date: 12/20/2018    Admitting Physician: Daria Norman MD   Primary Care Physician: Familia Burroughs MD     Admission Condition: good    Chief Complaint : Gait dysfunction secondary to below. Admit Diagnosis: Rotator cuff tear arthropathy of right shoulder [M75.101, M12.811]  S/P shoulder surgery   S/P shoulder surgery   Reverse right total shoulder arthroplasty  12/20/2018  Pain  DVT risk  Post op hemorrhagic anemia  Morbid obesity (HCC)Atrial fibrillation (HCC)  DM  HTN/ hypotension  arthritis  Acute Rehab Dx:  Gait impairment  Mobility and ambulation deficits  Self Care/ADL deficits        HPI: Alex Tellez is a 77 y.o. female patient at [address] [place] [address] who was admitted on 12/19/2018  by Daria Norman MD with below mentioned medical history, is being seen for Physical Medicine and Rehabilitation. .       Alex Tellez presented with worseneing right shoulder pain due to end stage DJD. Patient underwent a reverse right total shoulder arthroplasty with a Delta stem prosthesis and bicepsper Dr. Daria Norman MD on 12/19/2018. The patient's post operative course has been noted by hypotension, elevated Cr. ,MICHELL. Bp meds to be adjusted. We are consulted to assist with rehab needs and placement. Patient is to be NWB RUE. Patient is starting to mobilize with acute PT and OT. Patient is making expected gains with ambulation, mobility, and ADLs. Patient is primarily limited due to shoulder pain, decreased strength and NWB status. Alex Tellez is seen and examined today. Medical Records reviewed. Patient was a house hold ambulator at home, independent. She states she has had L knee pain due to torn meniscus, limiting her gait.   PT, OT to continue for right shoulder rehab per protocol. Therapies limited to active and passive range of motion right elbow, hand. Active assisted and passive range of motion right shoulder to tolerance. Pulleys and pendulums. Do not push motion. NWB right shoulder. Continue mobility, transfers, gait training.         Rehabiitation Course:   Functional  Level On Admission: Walk: Modified Independent  Functional Level At Discharge: Walk: Minimal Assist  Home Architecture: Home Situation  Home Environment: Private residence (12/20/18 1025)  Wheelchair Ramp: Yes (12/20/18 1025)  One/Two Story Residence: One story (12/20/18 1025)  Living Alone: No (12/20/18 1025)  Support Systems: Family member(s); Spouse/Significant Other/Partner (12/20/18 1025)  Patient Expects to be Discharged to[de-identified] Private residence (12/20/18 1025)  Current DME Used/Available at Home: [address] [facility], straight;Walker (12/20/18 1025)  Tub or Shower Type: Tub/Shower combination (12/20/18 1025)     Past Medical History:   Diagnosis Date    Anxiety     controlled with med    Arthritis     psoriatic    Atrial fibrillation ([address] 09/2018    [facility] by [facility]. [facility] Mar[facility])     breast; no malignancy    Chronic pain     left shoulder; knees, back    Diabetes ([address]     Type 2, pt denies and states it's ok now, Per PCP note- pt should be taking metformin.      Dysphagia     resolved s/p esophageal dilation Sept, 2016    Edema     ankles    GERD (gastroesophageal reflux disease)     managed with meds     Gout     History of, no recent episodes     Hypertension     manged with meds     Morbid obesity ([address] 10/12/2016    BMI 51.8    Murmur, cardiac     echo dated 9/10/18, mild tricuspid regurgitation, LVEF 55-65%, Mild pulmonary hypertension    Nausea & vomiting     Overactive bladder     Psychiatric disorder     anxiety    Restless leg syndrome     UTI (urinary tract infection)     frequent    Venous stasis     pt reports lower legs; denies ulcer. Had blisters- followed by wound care center. Blisters healed currently. Past Surgical History:   Procedure Laterality Date    BREAST SURGERY PROCEDURE UNLISTED      lumpectomy x 7    HX CATARACT REMOVAL Bilateral     HX CHOLECYSTECTOMY      HX HEENT  09/2016    esophageal dilation    HX HEENT      Right eye surgery     HX HYSTERECTOMY      with bladder tack and rectocele repair    HX ORTHOPAEDIC Right     ankle repair    HX ORTHOPAEDIC      Right shoulder scope     HX TONSILLECTOMY      HX TUBAL LIGATION        Family History   Problem Relation Age of Onset    Cancer Mother     Cancer Father       Social History     Tobacco Use    Smoking status: Never Smoker    Smokeless tobacco: Never Used   Substance Use Topics    Alcohol use: Yes     Alcohol/week: 0.6 oz     Types: 1 Glasses of wine per week     Comment: rare       No Known Allergies    Prior to Admission medications    Medication Sig Start Date End Date Taking? Authorizing Provider   aspirin delayed-release 81 mg tablet Take 81 mg by mouth daily. Yes Provider, Historical   warfarin (COUMADIN) 2.5 mg tablet Take 2.5 mg by mouth nightly. Yes Provider, Historical   metoprolol succinate (TOPROL XL) 25 mg XL tablet Take 25 mg by mouth daily. Yes Provider, Historical   allopurinol (ZYLOPRIM) 300 mg tablet Take 300 mg by mouth daily. Yes Provider, Historical   meloxicam (MOBIC) 15 mg tablet Take 15 mg by mouth daily as needed for Pain. Yes Provider, Historical   amiodarone (CORDARONE) 200 mg tablet Take 200 mg by mouth daily. Yes Provider, Historical   folic acid (FOLVITE) 1 mg tablet Take 1 mg by mouth daily. Yes Provider, Historical   cyanocobalamin, vitamin B-12, (VITAMIN B-12) 5,000 mcg subl 1 Tab by SubLINGual route daily. Yes Provider, Historical   VITAMIN B COMPLEX PO Take 1 Tab by mouth daily.    Yes Provider, Historical   oxyCODONE IR (ROXICODONE) 10 mg tab immediate release tablet Take 0.5 Tabs by mouth every four (4) hours as needed. Max Daily Amount: 30 mg. Indications: PAIN 10/20/16  Yes Jadon Alvarez MD   busPIRone (BUSPAR) 5 mg tablet Take 5 mg by mouth daily. Take morning of surgery per anesthesia guidelines. Yes Provider, Historical   cyclobenzaprine (FLEXERIL) 10 mg tablet Take  by mouth two (2) times a day. Yes Provider, Historical   FLUoxetine (PROZAC) 20 mg capsule Take 20 mg by mouth daily. Take morning of surgery per anesthesia guidelines. Indications: anxiety   Yes Provider, Historical   furosemide (LASIX) 40 mg tablet Take 40 mg by mouth as needed. Indications: EDEMA   Yes Provider, Historical   gabapentin (NEURONTIN) 600 mg tablet Take 600 mg by mouth nightly. Indications: RESTLESS LEGS SYNDROME   Yes Provider, Historical   HYDROcodone-acetaminophen (NORCO) 5-325 mg per tablet Take 1 Tab by mouth two (2) times daily as needed for Pain. Take morning of surgery per anesthesia guidelines if needed    Yes Provider, Historical   indapamide (LOZOL) 2.5 mg tablet Take 2.5 mg by mouth daily. Yes Provider, Historical   lisinopril (PRINIVIL, ZESTRIL) 20 mg tablet Take 20 mg by mouth daily. Indications: HYPERTENSION   Yes Provider, Historical   lidocaine (LIDODERM) 5 % 1 Patch by TransDERmal route every twelve (12) hours. Apply patch to the affected area for 12 hours a day and remove for 12 hours a day. Yes Provider, Historical   omeprazole (PRILOSEC) 20 mg capsule Take 20 mg by mouth two (2) times a day. Take morning of surgery per anesthesia guidelines. Indications: GASTROESOPHAGEAL REFLUX   Yes Provider, Historical   potassium chloride SR (KLOR-CON 8) 8 mEq tablet Take 8 mEq by mouth two (2) times a day. Indications: HYPOKALEMIA PREVENTION   Yes Provider, Historical   pramipexole (MIRAPEX) 0.25 mg tablet Take 0.25 mg by mouth nightly. Indications: RESTLESS LEGS SYNDROME   Yes Provider, Historical   tolterodine ER (DETROL LA) 4 mg ER capsule Take 4 mg by mouth nightly.    Yes Provider, Historical montelukast (SINGULAIR) 10 mg tablet Take 10 mg by mouth daily as needed. For fluid behind left ear; r/t MRSA ; Take / use AM day of surgery  per anesthesia protocols.     Provider, Historical       Current Medications:  Current Facility-Administered Medications   Medication Dose Route Frequency Provider Last Rate Last Dose    tuberculin injection 5 Units  5 Units IntraDERMal Marylene Reaper, MD   5 Units at 12/20/18 1442    allopurinol (ZYLOPRIM) tablet 300 mg  300 mg Oral DAILY Rodger Zhao MD   300 mg at 12/20/18 7435    amiodarone (CORDARONE) tablet 200 mg  200 mg Oral DAILY Rodger Zhao MD   200 mg at 12/20/18 0712    aspirin delayed-release tablet 81 mg  81 mg Oral DAILY Rodger Zhao MD   81 mg at 12/20/18 9618    busPIRone (BUSPAR) tablet 5 mg  5 mg Oral DAILY Rodger Zhao MD   5 mg at 12/20/18 4202    cyclobenzaprine (FLEXERIL) tablet 10 mg  10 mg Oral BID Rodger Zhao MD   10 mg at 12/20/18 1718    FLUoxetine (PROzac) capsule 20 mg  20 mg Oral DAILY Rodger Zhao MD   20 mg at 87/92/90 4568    folic acid (FOLVITE) tablet 1 mg  1 mg Oral DAILY Rodger Zhao MD   1 mg at 12/20/18 3855    gabapentin (NEURONTIN) capsule 600 mg  600 mg Oral QHS Rodger Zhao MD   Stopped at 12/19/18 2200    HYDROcodone-acetaminophen (NORCO) 5-325 mg per tablet 1 Tab  1 Tab Oral Q4H PRN Rodger Zhao MD   1 Tab at 12/20/18 1742    indapamide (LOZOL) tablet 2.5 mg (Patient Supplied)  2.5 mg Oral DAILY Rodger Zhao MD   2.5 mg at 12/20/18 0900    meloxicam (MOBIC) tablet 15 mg  15 mg Oral DAILY PRN Rodger Zhao MD        metoprolol succinate (TOPROL-XL) XL tablet 25 mg  25 mg Oral DAILY Rodger Zhao MD   Stopped at 12/20/18 0900    montelukast (SINGULAIR) tablet 10 mg  10 mg Oral DAILY PRN Rodger Zhao MD        pantoprazole (PROTONIX) tablet 40 mg  40 mg Oral ACB Rodger Zhao MD   40 mg at 12/20/18 0534    potassium chloride (KLOR-CON) packet for solution 20 mEq  20 mEq Oral DAILY Catherine Mcintyre MD   20 mEq at 12/20/18 6465    pramipexole (MIRAPEX) tablet 0.25 mg  0.25 mg Oral QHS Catherine Mcintyre MD   0.25 mg at 12/19/18 2037    oxybutynin chloride XL (DITROPAN XL) tablet 5 mg  5 mg Oral DAILY Catherine Mcintyre MD   5 mg at 12/20/18 7889    warfarin (COUMADIN) tablet 2.5 mg  2.5 mg Oral QHS Catherine Mcintyre MD   2.5 mg at 12/20/18 1718    dextrose 5% and 0.9% NaCl infusion  100 mL/hr IntraVENous CONTINUOUS Zo Dillard  mL/hr at 12/20/18 0112 100 mL/hr at 12/20/18 0112    sodium chloride (NS) flush 5-10 mL  5-10 mL IntraVENous Q8H Catherine Mcintyre MD   5 mL at 12/20/18 1444    sodium chloride (NS) flush 5-10 mL  5-10 mL IntraVENous PRN Catherine Mcintyre MD        acetaminophen (TYLENOL) tablet 650 mg  650 mg Oral Q4H PRN Catherine Mcintyre MD        ondansetron [facility]) injection 4 mg  4 mg IntraVENous Q4H PRN Catherine Mcintyre MD        senna-docusate (PERICOLACE) 8.6-50 mg per tablet 1 Tab  1 Tab Oral BID PRN Catherine Mcintyre MD        zolpidem [facility]) tablet 5 mg  5 mg Oral QHS PRN Catherine Mcintyre MD        oxyCODONE IR (ROXICODONE) tablet 15 mg  15 mg Oral Q3H PRN Catherine Mcintyre MD        furosemide (LASIX) tablet 40 mg  40 mg Oral DAILY PRN Catherine Mcintyre MD            Review of Systems: Denies chest pain, shortness of breath, cough, headache, visual problems, abdominal pain, dysurea, calf pain. Pertinent positives are as noted in the medical records and unremarkable otherwise. Vital Signs:   Patient Vitals for the past 8 hrs:   BP Temp Pulse Resp SpO2   12/20/18 1219 (!) 88/56  77     12/20/18 1137 (!) 65/36 98.4 °F (36.9 °C) 71 18 97 %        Physical Exam:   General: Alert and age appropriately oriented. Obese. No acute cardio respiratory distress. HEENT: Normocephalic. Oral mucosa moist without cyanosis. Lungs: Clear to auscultation  bilaterally.   Respiration even and unlabored   Heart: Regular rate and rhythm, S1, S2   No  murmurs, clicks, rub or gallops   Abdomen: Soft, non-tender, nondistended. Bowel sounds present   Genitourinary: defered   Neuromuscular:      Grossly no focal neurological deficits noted. R wrist extension 5/5   R wrist flexion 5/5   R ADMQ 5/5   No sensory deficits distally BUE to soft touch. L Ankle dorsiflexion 5/5   L Ankle plantarflexion 5/5  R Ankle dorsiflexion 5/5   R Ankle plantarflexion 5/5  No sensory deficits BLE distally. Skin/extremity: No rashes, no erythema. Calves soft. Wound covered.      Skin Incision(s)/Wound(s):        Lab Review:   Recent Results (from the past 72 hour(s))   GLUCOSE, POC    Collection Time: 12/19/18  9:50 AM   Result Value Ref Range    Glucose (POC) 90 65 - 100 mg/dL   POC PT/INR    Collection Time: 12/19/18  9:51 AM   Result Value Ref Range    Prothrombin time (POC) 16.6 (H) 9.6 - 11.6 SECS    INR (POC) 1.4 (H) 0.9 - 1.2     TYPE & SCREEN    Collection Time: 12/19/18  9:53 AM   Result Value Ref Range    Crossmatch Expiration 12/22/2018     ABO/Rh(D) [place] POSITIVE     Antibody screen NEG    PROTHROMBIN TIME + INR    Collection Time: 12/20/18  5:26 AM   Result Value Ref Range    Prothrombin time 18.6 (H) 11.7 - 14.5 sec    INR 1.5     HGB & HCT    Collection Time: 12/20/18  5:26 AM   Result Value Ref Range    HGB 8.5 (L) 11.7 - 15.4 g/dL    HCT 27.5 (L) 35.8 - 46.3 %       PT Initial  PT Most Recent                                             Distance (ft): 3 Feet (ft) (12/20/18 1025) 3 Feet (ft) (12/20/18 1025)   Assistive Device: Walker, rolling (12/20/18 1025) Walker, rolling (12/20/18 1025)       OT Initial OT Most Recent                                               ST Initial ST Most Recent                        Principal Problem:    Rotator cuff tear arthropathy of right shoulder (12/19/2018)          Condition on discharge :  good  Rehabilitation  potential : Good     Goals in Rehab : Patient to reach maximal rehabilitation potential in functional mobility,ambulation and ADL/ self care skills ; to improve strength and endurance  PT, OT to continue for right shoulder rehab per protocol. Therapies limited to active and passive range of motion right elbow, hand. Active assisted and passive range of motion right shoulder to tolerance. Pulleys and pendulums. Do not push motion. NWB right shoulder      Expected Length Of Stay : Depending on pace of progress in mobility and self care in PT and OT ,therapies    Discharge Instructions: 1. Devices:Walkers, Type: Rolling Walker  2. Consult:Rehab team including PT, OT, and . 3. Disposition Rehab-discussed with patient. 4. Thigh-high or knee-high BARRIE's when out of bed. 5. DVT Prophylaxis - per primary. Home warfarin resumed for a.fib. adjust as appropriate per facility MD..   6. Incentive spirometer Q1H while awake  7. Post op hemorrhagic anemia-monitor. 8. Activity: NWB RUE, WBAT BLE. PT, OT to continue for right shoulder rehab per protocol. Therapies limited to active and passive range of motion right elbow, hand. Active assisted and passive range of motion right shoulder to tolerance. Pulleys and pendulums. Do not push motion. NWB right shoulder. Continue mobility, transfers, gait training.       9. Planned Labs: CBC,BMP, INR  10. Pain Control:  Continue scheduled tylenol, and PRN meds. resume home gabapentin. 11. Wound Care: Keep right shoulder wound clean and dry and reinforce dressing PRN. 12. Hypotension/ post op - hold ACEI. Check renal labs in am. monitor renal function.         Follow up with ORTHO, Dr. Trixie Milton MD per instructions.    [address] [place] [address] [phone]        Discharge Medications:            oxyCODONE IR (ROXICODONE) tablet 10 mg  1 Tab  Ordered Dose: 10mg Route: Oral Frequency: EVERY 4 HOURS  as needed for pain                    senna-docusate (PERICOLACE) 8.6-50 mg per tablet 2 Tab Ordered Dose: 2 Tab Route: Oral Frequency: DAILY       Current Discharge Medication List      CONTINUE these medications which have NOT CHANGED    Details   aspirin delayed-release 81 mg tablet Take 81 mg by mouth daily. warfarin (COUMADIN) 2.5 mg tablet Take 2.5 mg by mouth nightly. metoprolol succinate (TOPROL XL) 25 mg XL tablet Take 25 mg by mouth daily. allopurinol (ZYLOPRIM) 300 mg tablet Take 300 mg by mouth daily. meloxicam (MOBIC) 15 mg tablet Take 15 mg by mouth daily as needed for Pain. amiodarone (CORDARONE) 200 mg tablet Take 200 mg by mouth daily. folic acid (FOLVITE) 1 mg tablet Take 1 mg by mouth daily. cyanocobalamin, vitamin B-12, (VITAMIN B-12) 5,000 mcg subl 1 Tab by SubLINGual route daily. VITAMIN B COMPLEX PO Take 1 Tab by mouth daily. busPIRone (BUSPAR) 5 mg tablet Take 5 mg by mouth daily. cyclobenzaprine (FLEXERIL) 10 mg tablet Take  by mouth two (2) times a day. FLUoxetine (PROZAC) 20 mg capsule Take 20 mg by mouth daily. furosemide (LASIX) 40 mg tablet Take 40 mg by mouth as needed. Indications: EDEMA      gabapentin (NEURONTIN) 600 mg tablet Take 600 mg by mouth nightly. Indications: RESTLESS LEGS SYNDROME      HYDROcodone-acetaminophen (NORCO) 5-325 mg per tablet Take 1 Tab by mouth every 4hours as needed for Pain. indapamide (LOZOL) 2.5 mg tablet Take 2.5 mg by mouth daily. lisinopril (PRINIVIL, ZESTRIL) 20 mg tablet Take 20 mg by mouth daily. Indications: HYPERTENSION  HOLD.        lidocaine (LIDODERM) 5 % 1 Patch by TransDERmal route every twelve (12) hours. Apply patch to the affected area for 12 hours a day and remove for 12 hours a day. omeprazole (PRILOSEC) 20 mg capsule Take 20 mg by mouth two (2) times a day. Indications: GASTROESOPHAGEAL REFLUX      potassium chloride SR (KLOR-CON 8) 8 mEq tablet Take 8 mEq by mouth two (2) times a day.  Indications: HYPOKALEMIA PREVENTION      pramipexole (MIRAPEX) 0.25 mg tablet Take 0.25 mg by mouth nightly. Indications: RESTLESS LEGS SYNDROME      tolterodine ER (DETROL LA) 4 mg ER capsule Take 4 mg by mouth nightly. montelukast (SINGULAIR) 10 mg tablet Take 10 mg by mouth daily as needed. For fluid behind left ear; r/t MRSA             Discharge time: 35 minutes.     Signed By: Kay Farias MD     December 20, 2018

## 2018-12-20 NOTE — PROGRESS NOTES
Pt resting in bed, fluids at 100ml/hr. Bp at 97/51. Pt states she feels better and \"back to normal\". No needs stated.  Will continue to monitor    Pt has complete sensation in RUE with movement  Denies pain in RUE at this time

## 2018-12-20 NOTE — PROGRESS NOTES
Ortho on call called 2x since BP of 58/34 at 0001. Have not heard back. Pt in trendelenburg position in bed with fluids.

## 2018-12-20 NOTE — CONSULTS
Physical Medicine & Rehabilitation Note-consult    Patient: Cyn Esquivel MRN: [id number]  SSN: [id number]    YOB: 1950  Age: 77 y.o. Sex: female      Admit Date: 12/19/2018  Admitting Physician: Merari Martinez MD    Medical Decision Making/Plan/Recommend: s/p Reverse right total shoulder arthroplasty. Mobility impairment and functional deficits. Patient shows slow, expected functional gains due to pain, weakness. .   Continue PT, OT for right shoulder rehab per protocol. Therapies limited to active and passive range of motion right elbow, hand. Active assisted and passive range of motion right shoulder to tolerance. Pulleys and pendulums. Do not push motion. NWB right shoulder. Continue mobility, transfers, gait training. Will follow progress. Discussed rehab options and goals with patient. Recommend sub acute rehab at [facility]. Chief Complaint : Gait dysfunction secondary to below. Admit Diagnosis: Rotator cuff tear arthropathy of right shoulder [M75.101, M12.811]  S/P shoulder surgery   S/P shoulder surgery   Reverse right total shoulder arthroplasty  12/20/2018  Pain  DVT risk  Post op hemorrhagic anemia  Morbid obesity (HCC)Atrial fibrillation (HCC)  DM  HTN  arthritis  Acute Rehab Dx:  Gait impairment  Mobility and ambulation deficits  Self Care/ADL deficits    Medical Dx:  Past Medical History:   Diagnosis Date    Anxiety     controlled with med    Arthritis     psoriatic    Atrial fibrillation ([address] 09/2018    [facility] [address] [place] [address])     breast; no malignancy    Chronic pain     left shoulder; knees, back    Diabetes ([address]     Type 2, pt denies and states it's ok now, Per PCP note- pt should be taking metformin.      Dysphagia     resolved s/p esophageal dilation Sept, 2016    Edema     ankles    GERD (gastroesophageal reflux disease)     managed with meds     Gout     History of, no recent episodes     Hypertension manged with meds     Morbid obesity ([facility] 10/12/2016    BMI 51.8    Murmur, cardiac     echo dated 9/10/18, mild tricuspid regurgitation, LVEF 55-65%, Mild pulmonary hypertension    Nausea & vomiting     Overactive bladder     Psychiatric disorder     anxiety    Restless leg syndrome     UTI (urinary tract infection)     frequent    Venous stasis     pt reports lower legs; denies ulcer. Had blisters- followed by wound care center. Blisters healed currently. Subjective:     Date of Evaluation:  December 20, 2018    HPI: Jennie Turner is a 77 y.o. female patient at [address] [place] [address] who was admitted on 12/19/2018  by Neto Snyder MD with below mentioned medical history, is being seen for Physical Medicine and Rehabilitation consult. Jennie Turner presented with worseneing right shoulder pain due to end stage DJD. Patient underwent a reverse right total shoulder arthroplasty with a Delta stem prosthesis and bicepsper Dr. Neto Snyder MD on 12/19/2018. The patient's post operative course has been noted by hypotension, Bp meds to be adjusted. We are consulted to assist with rehab needs and placement. Patient is to be NWB RUE. Patient is starting to mobilize with acute PT and OT. Patient is making expected gains with ambulation, mobility, and ADLs. Patient is primarily limited due to shoulder pain, decreased strength and NWB status. Jennie Turner is seen and examined today. Medical Records reviewed. Patient was a house hold ambulator at home, independent. Current Level of Function: bed mobility - mod A, transfers - mod A, decreased balance , ambulation - 3' with min assist    Prior Level of Function/Work/Activity:  Pt living at home with spouse, limited household ambulator.          Family History   Problem Relation Age of Onset    Cancer Mother     Cancer Father       Social History     Tobacco Use    Smoking status: Never Smoker    Smokeless tobacco: Never Used Substance Use Topics    Alcohol use: Yes     Alcohol/week: 0.6 oz     Types: 1 Glasses of wine per week     Comment: rare     Past Surgical History:   Procedure Laterality Date    BREAST SURGERY PROCEDURE UNLISTED      lumpectomy x 7    HX CATARACT REMOVAL Bilateral     HX CHOLECYSTECTOMY      HX HEENT  09/2016    esophageal dilation    HX HEENT      Right eye surgery     HX HYSTERECTOMY      with bladder tack and rectocele repair    HX ORTHOPAEDIC Right     ankle repair    HX ORTHOPAEDIC      Right shoulder scope     HX TONSILLECTOMY      HX TUBAL LIGATION        Prior to Admission medications    Medication Sig Start Date End Date Taking? Authorizing Provider   aspirin delayed-release 81 mg tablet Take 81 mg by mouth daily. Yes Provider, Historical   warfarin (COUMADIN) 2.5 mg tablet Take 2.5 mg by mouth nightly. Yes Provider, Historical   metoprolol succinate (TOPROL XL) 25 mg XL tablet Take 25 mg by mouth daily. Yes Provider, Historical   allopurinol (ZYLOPRIM) 300 mg tablet Take 300 mg by mouth daily. Yes Provider, Historical   meloxicam (MOBIC) 15 mg tablet Take 15 mg by mouth daily as needed for Pain. Yes Provider, Historical   amiodarone (CORDARONE) 200 mg tablet Take 200 mg by mouth daily. Yes Provider, Historical   folic acid (FOLVITE) 1 mg tablet Take 1 mg by mouth daily. Yes Provider, Historical   cyanocobalamin, vitamin B-12, (VITAMIN B-12) 5,000 mcg subl 1 Tab by SubLINGual route daily. Yes Provider, Historical   VITAMIN B COMPLEX PO Take 1 Tab by mouth daily. Yes Provider, Historical   oxyCODONE IR (ROXICODONE) 10 mg tab immediate release tablet Take 0.5 Tabs by mouth every four (4) hours as needed. Max Daily Amount: 30 mg. Indications: PAIN 10/20/16  Yes Aamir Chung MD   busPIRone (BUSPAR) 5 mg tablet Take 5 mg by mouth daily. Take morning of surgery per anesthesia guidelines.    Yes Provider, Historical   cyclobenzaprine (FLEXERIL) 10 mg tablet Take  by mouth two (2) times a day. Yes Provider, Historical   FLUoxetine (PROZAC) 20 mg capsule Take 20 mg by mouth daily. Take morning of surgery per anesthesia guidelines. Indications: anxiety   Yes Provider, Historical   furosemide (LASIX) 40 mg tablet Take 40 mg by mouth as needed. Indications: EDEMA   Yes Provider, Historical   gabapentin (NEURONTIN) 600 mg tablet Take 600 mg by mouth nightly. Indications: RESTLESS LEGS SYNDROME   Yes Provider, Historical   HYDROcodone-acetaminophen (NORCO) 5-325 mg per tablet Take 1 Tab by mouth two (2) times daily as needed for Pain. Take morning of surgery per anesthesia guidelines if needed    Yes Provider, Historical   indapamide (LOZOL) 2.5 mg tablet Take 2.5 mg by mouth daily. Yes Provider, Historical   lisinopril (PRINIVIL, ZESTRIL) 20 mg tablet Take 20 mg by mouth daily. Indications: HYPERTENSION   Yes Provider, Historical   lidocaine (LIDODERM) 5 % 1 Patch by TransDERmal route every twelve (12) hours. Apply patch to the affected area for 12 hours a day and remove for 12 hours a day. Yes Provider, Historical   omeprazole (PRILOSEC) 20 mg capsule Take 20 mg by mouth two (2) times a day. Take morning of surgery per anesthesia guidelines. Indications: GASTROESOPHAGEAL REFLUX   Yes Provider, Historical   potassium chloride SR (KLOR-CON 8) 8 mEq tablet Take 8 mEq by mouth two (2) times a day. Indications: HYPOKALEMIA PREVENTION   Yes Provider, Historical   pramipexole (MIRAPEX) 0.25 mg tablet Take 0.25 mg by mouth nightly. Indications: RESTLESS LEGS SYNDROME   Yes Provider, Historical   tolterodine ER (DETROL LA) 4 mg ER capsule Take 4 mg by mouth nightly. Yes Provider, Historical   montelukast (SINGULAIR) 10 mg tablet Take 10 mg by mouth daily as needed. For fluid behind left ear; r/t MRSA ; Take / use AM day of surgery  per anesthesia protocols. Provider, Historical     No Known Allergies     Review of Systems: +right shoulder pain, + unsteady gait.  Denies chest pain, shortness of breath, cough, headache, visual problems, abdominal pain, dysurea, calf pain. Pertinent positives are as noted in the medical records and unremarkable otherwise. Objective:     Vitals:  Blood pressure (!) 88/56, pulse 77, temperature 98.4 °F (36.9 °C), resp. rate 18, height 5' 2.01\" (1.575 m), weight 285 lb (129.3 kg), SpO2 97 %, not currently breastfeeding. Temp (24hrs), Av °F (36.7 °C), Min:97.5 °F (36.4 °C), Max:98.4 °F (36.9 °C)    BMI (calculated): 52.1 (18 0915)   Intake and Output:   1901 -  0700  In: 1500 [I.V.:1500]  Out:  [Urine:1675]    Physical Exam:   General: Alert and age appropriately oriented. Obese. No acute cardio respiratory distress. HEENT: Normocephalic, no conjunctival pallor. Oral mucosa moist without cyanosis. No JVD. Lungs: Clear to auscultation anteriorly  Respiration even and unlabored   Heart: Regular rate and rhythm, S1, S2   No  Murmurs. Abdomen: Soft, non-tender, non-distended. Genitourinary: defered   Neuromuscular:      Grossly no focal motor deficits. Moves left fingers, hand well. R wrist extension 5/5   R wrist flexion 5/5   R ADMQ 5/5   No sensory deficits distally BUE to soft touch. Skin/extremity: Non tender calves BLE. No rashes, no marginal erythema.                                                                                          Labs/Studies:  Recent Results (from the past 72 hour(s))   GLUCOSE, POC    Collection Time: 18  9:50 AM   Result Value Ref Range    Glucose (POC) 90 65 - 100 mg/dL   POC PT/INR    Collection Time: 18  9:51 AM   Result Value Ref Range    Prothrombin time (POC) 16.6 (H) 9.6 - 11.6 SECS    INR (POC) 1.4 (H) 0.9 - 1.2     TYPE & SCREEN    Collection Time: 18  9:53 AM   Result Value Ref Range    Crossmatch Expiration 2018     ABO/Rh(D) Ghada Salvia POSITIVE     Antibody screen NEG    PROTHROMBIN TIME + INR    Collection Time: 18  5:26 AM   Result Value Ref Range Prothrombin time 18.6 (H) 11.7 - 14.5 sec    INR 1.5     HGB & HCT    Collection Time: 12/20/18  5:26 AM   Result Value Ref Range    HGB 8.5 (L) 11.7 - 15.4 g/dL    HCT 27.5 (L) 35.8 - 46.3 %       Functional Assessment:  Reviewed participation and progress in therapies      Bed Mobility  Rolling: Moderate assistance;Assist x2 (12/20/18 1400)  Supine to Sit: Moderate assistance; Additional time; Other (comment)(head of bed elevated ) (12/20/18 1025)  Sit to Supine: Moderate assistance; Additional time;Assist x2 (12/20/18 1025)   Balance  Sitting: Intact (12/20/18 1025)  Standing: Impaired;Pull to stand; With support (12/20/18 1025)  Standing - Static: Constant support;Good (12/20/18 1025)  Standing - Dynamic : Fair (12/20/18 1025)               Bed/Mat Mobility  Rolling: Moderate assistance;Assist x2 (12/20/18 1400)  Supine to Sit: Moderate assistance; Additional time; Other (comment)(head of bed elevated ) (12/20/18 1025)  Sit to Supine: Moderate assistance; Additional time;Assist x2 (12/20/18 1025)  Sit to Stand: Moderate assistance; Additional time; Other (comment)(plus CGA of one more) (12/20/18 1025)     Ambulation:  Gait  Speed/Nancy: Pace decreased (<100 feet/min); Shuffled (12/20/18 1025)  Step Length: Left shortened;Right shortened (12/20/18 1025)  Gait Abnormalities: Antalgic;Decreased step clearance;Shuffling gait; Step to gait;Trunk sway increased (12/20/18 1025)  Ambulation - Level of Assistance: Minimal assistance; Other (comment)(of 1 to 2) (12/20/18 1025)  Distance (ft): 3 Feet (ft) (12/20/18 1025)  Assistive Device: Walker, rolling (12/20/18 1025)  Interventions: Safety awareness training;Verbal cues (12/20/18 1025)    Impression/Plan:     Principal Problem:    Rotator cuff tear arthropathy of right shoulder (12/19/2018)        Current Facility-Administered Medications   Medication Dose Route Frequency Provider Last Rate Last Dose    tuberculin injection 5 Units  5 Units IntraDERMal ONCE Elena Cobb, MD   5 Units at 12/20/18 1442    allopurinol (ZYLOPRIM) tablet 300 mg  300 mg Oral DAILY Jonnie Downing MD   300 mg at 12/20/18 6866    amiodarone (CORDARONE) tablet 200 mg  200 mg Oral DAILY Jonnie Downing MD   200 mg at 12/20/18 5748    aspirin delayed-release tablet 81 mg  81 mg Oral DAILY Jonnie Downing MD   81 mg at 12/20/18 1176    busPIRone (BUSPAR) tablet 5 mg  5 mg Oral DAILY Jonnie Downing MD   5 mg at 12/20/18 3079    cyclobenzaprine (FLEXERIL) tablet 10 mg  10 mg Oral BID Jonnie Downing MD   10 mg at 12/20/18 1718    FLUoxetine (PROzac) capsule 20 mg  20 mg Oral DAILY Jonnie Downing MD   20 mg at 02/95/68 2810    folic acid (FOLVITE) tablet 1 mg  1 mg Oral DAILY Jonnie Downing MD   1 mg at 12/20/18 0315    gabapentin (NEURONTIN) capsule 600 mg  600 mg Oral QHS Jonnie Downing MD   Stopped at 12/19/18 2200    HYDROcodone-acetaminophen (NORCO) 5-325 mg per tablet 1 Tab  1 Tab Oral Q4H PRN Jonnie Downing MD   1 Tab at 12/20/18 1742    indapamide (LOZOL) tablet 2.5 mg (Patient Supplied)  2.5 mg Oral DAILY Jonnie Downing MD   2.5 mg at 12/20/18 0900    lisinopril (PRINIVIL, ZESTRIL) tablet 20 mg  20 mg Oral DAILY Jonnie Downing MD   Stopped at 12/20/18 0900    meloxicam (MOBIC) tablet 15 mg  15 mg Oral DAILY PRN Jonnie Downing MD        metoprolol succinate (TOPROL-XL) XL tablet 25 mg  25 mg Oral DAILY Jonnie Downing MD   Stopped at 12/20/18 0900    montelukast (SINGULAIR) tablet 10 mg  10 mg Oral DAILY PRN Jonnie Downing MD        pantoprazole (PROTONIX) tablet 40 mg  40 mg Oral ACB Jonnie Downing MD   40 mg at 12/20/18 0534    potassium chloride (KLOR-CON) packet for solution 20 mEq  20 mEq Oral DAILY Jonnie Downing MD   20 mEq at 12/20/18 0813    pramipexole (MIRAPEX) tablet 0.25 mg  0.25 mg Oral QHS Jonnie Downing MD   0.25 mg at 12/19/18 2037    oxybutynin chloride XL (DITROPAN XL) tablet 5 mg  5 mg Oral DAILY Joann Mcdermott, Gracia Browning MD   5 mg at 12/20/18 4399    warfarin (COUMADIN) tablet 2.5 mg  2.5 mg Oral QHS Aviva Riggins MD   2.5 mg at 12/20/18 1718    dextrose 5% and 0.9% NaCl infusion  100 mL/hr IntraVENous CONTINUOUS Shira White  mL/hr at 12/20/18 0112 100 mL/hr at 12/20/18 0112    sodium chloride (NS) flush 5-10 mL  5-10 mL IntraVENous Q8H Aviva Riggins MD   5 mL at 12/20/18 1444    sodium chloride (NS) flush 5-10 mL  5-10 mL IntraVENous PRN Aviva Riggins MD        acetaminophen (TYLENOL) tablet 650 mg  650 mg Oral Q4H PRN Aviva Riggins MD        ondansetron [facility]) injection 4 mg  4 mg IntraVENous Q4H PRN Aviva Riggins MD        senna-docusate (PERICOLACE) 8.6-50 mg per tablet 1 Tab  1 Tab Oral BID PRN Aviva Riggins MD        zolpidem [employer] [facility]) tablet 5 mg  5 mg Oral QHS PRN Aviva Riggins MD        oxyCODONE IR (ROXICODONE) tablet 15 mg  15 mg Oral Q3H PRN Aviva Riggins MD        furosemide (LASIX) tablet 40 mg  40 mg Oral DAILY PRN Aviva Riggins MD            Recommendations: Recommend sub acute rehab at [facility]. Coordination of rehab/medical care. Counseling of Physical Medicine & Rehab care issues management. Monitoring and management of rehab conditions per the plan of care/orders. Will follow along with you for PM&R issues and provide you follow up. Will follow with SW/ /Admissions Coordinators regarding insurance approvals and acceptance. Continue PT, OT for right shoulder rehab per protocol. Therapies limited to active and passive range of motion right elbow, hand. Active assisted and passive range of motion right shoulder to tolerance. Pulleys and pendulums. Do not push motion. NWB right shoulder. Continue mobility, transfers, gait training. Will follow progress. Rehabilitation Management/ Medical Management: 1. Devices:Walkers, Type: Rolling Walker  2. Consult:Rehab team including PT, OT, and .   3. Disposition Rehab-discussed with patient. 4. Thigh-high or knee-high BARRIE's when out of bed. 5. DVT Prophylaxis - per primary. Home warfarin resumed for a.fib.   6. Incentive spirometer Q1H while awake  7. Post op hemorrhagic anemia-monitor. asymptomatic   8. Activity: NWB RUE, WBAT BLE  9. Planned Labs: CBC,BMP, INR  10. Pain Control:  Continue scheduled tylenol, and PRN meds. resume home gabapentin. 11. Wound Care: Keep right shoulder wound clean and dry and reinforce dressing PRN. 12. Hypotension/ post op -  ACEI. Check renal labs in am.        Follow up with Dr. Felisa Ward MD per instructions. Thank you for the opportunity to participate in the care of this patient.     Signed By: Kay Farias MD     December 20, 2018

## 2018-12-20 NOTE — PROGRESS NOTES
Problem: Mobility Impaired (Adult and Pediatric)  Goal: *Acute Goals and Plan of Care (Insert Text)  STG:  (1.)Ms. Janeth Serna will move from supine to sit and sit to supine  with MINIMAL ASSIST within 4-7 treatment day(s). (2.)Ms. Janeth Serna will transfer from bed to chair and chair to bed with MINIMAL ASSIST of 2 using the least restrictive device within 4-7 treatment day(s). (3.)Ms. Janeth Serna will ambulate with MINIMAL ASSIST of 2 for 50 feet with the least restrictive device within 4-7 treatment day(s). LTG:  (1.)Ms. Janeth Serna will move from supine to sit and sit to supine  in bed with CONTACT GUARD ASSIST within 7-14 treatment day(s). (2.)Ms. Janeth Serna will transfer from bed to chair and chair to bed with MINIMAL ASSIST using the least restrictive device within 7-14 treatment day(s). (3.)Ms. Janeth Serna will ambulate with MINIMAL ASSIST for 100 feet with the least restrictive device within 7-14 treatment day(s). ________________________________________________________________________________________________    PHYSICAL THERAPY: Initial Assessment, PM 12/20/2018  INPATIENT: Hospital Day: 2  [place] / Product Type: Medicare /      NAME/AGE/GENDER: Joesph Barrera is a 77 y.o. female   PRIMARY DIAGNOSIS: Rotator cuff tear arthropathy of right shoulder [M75.101, M12.811] Rotator cuff tear arthropathy of right shoulder Rotator cuff tear arthropathy of right shoulder  Procedure(s) (LRB):  RIGHT SHOULDER ARTHROPLASTY TOTAL REVERSE  / [place] / INTERSCALENE IP CODE ONLY (Right)  1 Day Post-Op  ICD-10: Treatment Diagnosis:   · Pain in Right Shoulder (M25.511)  · Stiffness of Right Shoulder, Not elsewhere classified (M25.611)  · Generalized Muscle Weakness (M62.81)  · Difficulty in walking, Not elsewhere classified (R26.2)   Precaution/Allergies:  Patient has no known allergies.       ASSESSMENT:     Ms. Janeth Serna presents supine on contact s/p right shoulder arthroplasty reverse. At home she was independent with gait but limited to short distances due to right knee pain (states she has a torn meniscus but has been told she is not a candidate for surgery). Pt needed significant assistance to move from supine to sit-now that her right upper extremity has had surgery and she was not able to rely on her right arm for mobility. Also needed moderate assist of one to 2 people to move from sit to stand and to take a few steps by the bedside. Helped her lay back down with assist of 2 people due to her low BP and RN wanted us to be a little cautious this morning. She did well with her shoulder exercises with verbal cues although admits her shoulder still may be a little numb. Talked with SW about options for rehab, pt would not be safe to be up without assistance. Pt left in supine with ice on shoulder and needs in reach. Hope to progress at next treatment session. She is supine upon arrival.  Therapist and pt performs and review exercises. Work on bed mobility as follows: rolling from L><R x 2 in order to get [facility] to [facility]. She remain in supine with call light near. This section established at most recent assessment   PROBLEM LIST (Impairments causing functional limitations):  1. Decreased [place] with Bed Mobility  2. Decreased [place] with Transfers  3. Decreased [place] with Ambulation   4. Decreased [place] with shoulder HEP   INTERVENTIONS PLANNED: (Benefits and precautions of physical therapy have been discussed with the patient.)  1. Bed Mobility Training  2. Transfer Training  3. Gait Training  4. Therapeutic Exercises per MD orders  5. Modalities for Pain     TREATMENT PLAN: Frequency/Duration: twice daily for duration of hospital stay  Rehabilitation Potential For Stated Goals: Good     RECOMMENDED REHABILITATION/EQUIPMENT: (at time of discharge pending progress): Continue Skilled Therapy, Rehab and Discussed with Case Management.               HISTORY: History of Present Injury/Illness (Reason for Referral):  Pt s/p above procedure on 12/20/18  Past Medical History/Comorbidities:   Ms. Vanna Vidal  has a past medical history of Anxiety, Arthritis, Atrial fibrillation ([address] Cancer ([address] Chronic pain, Diabetes ([address] Dysphagia, Edema, GERD (gastroesophageal reflux disease), Gout, Hypertension, Morbid obesity ([address] Murmur, cardiac, Nausea & vomiting, Overactive bladder, Psychiatric disorder, Restless leg syndrome, UTI (urinary tract infection), and Venous stasis. Ms. Vanna Vidal  has a past surgical history that includes hx cholecystectomy; hx orthopaedic (Right); hx heent (09/2016); hx tonsillectomy; hx cataract removal (Bilateral); hx hysterectomy; hx tubal ligation; pr breast surgery procedure unlisted; hx orthopaedic; hx heent; RIGHT SHOULDER ARTHROPLASTY TOTAL Maya Cosier / [place] / INTERSCALENE IP CODE ONLY (Right, 12/19/2018); RIGHT ARTHROSCOPIC BICEPS TENOTOMY and ACROMIOPLASTY (Right, 1/5/2018); and LEFT SHOULDER ARTHROPLASTY MOISES/ EXACTECH WITH CTA HEAD (Left, 10/18/2016). Social History/Living Environment:   Home Environment: Private residence  Wheelchair Ramp: Yes  One/Two Story Residence: One story  Living Alone: No  Support Systems: Family member(s), Spouse/Significant Other/Partner  Patient Expects to be Discharged to[de-identified] Private residence  Current DME Used/Available at Home: [place], [place], [place]  Tub or Shower Type: Tub/Shower combination  Prior Level of Function/Work/Activity:  Pt living at home with spouse, limited household ambulator. Number of Personal Factors/Comorbidities that affect the Plan of Care: 1-2: MODERATE COMPLEXITY   EXAMINATION:   Most Recent Physical Functioning:   Gross Assessment:                  Posture:     Balance:    Bed Mobility:  Rolling: Moderate assistance;Assist x2  Duration: 10 Minutes  Wheelchair Mobility:     Transfers:     Gait:            Body Structures Involved:  1. Joints  2.  Muscles Body Functions Affected:  1. Movement Related Activities and Participation Affected:  1. Mobility  2. Self Care   Number of elements that affect the Plan of Care: 4+: HIGH COMPLEXITY   CLINICAL PRESENTATION:   Presentation: Stable and uncomplicated: LOW COMPLEXITY   CLINICAL DECISION MAKIN [facility] [address] [id number] AM-PAC 6 Clicks   Basic Mobility Inpatient Short Form  How much difficulty does the patient currently have. .. Unable A Lot A Little None   1. Turning over in bed (including adjusting bedclothes, sheets and blankets)? [] 1   [x] 2   [] 3   [] 4   2. Sitting down on and standing up from a chair with arms ( e.g., wheelchair, bedside commode, etc.)   [] 1   [x] 2   [] 3   [] 4   3. Moving from lying on back to sitting on the side of the bed? [] 1   [x] 2   [] 3   [] 4   How much help from another person does the patient currently need. .. Total A Lot A Little None   4. Moving to and from a bed to a chair (including a wheelchair)? [] 1   [x] 2   [] 3   [] 4   5. Need to walk in hospital room? [] 1   [x] 2   [] 3   [] 4   6. Climbing 3-5 steps with a railing? [] 1   [x] 2   [] 3   [] 4   © , Trustees of [facility] [address] [address], under license to [employer]. All rights reserved      Score:  Initial: 12 Most Recent: X (Date: -- )    Interpretation of Tool:  Represents activities that are increasingly more difficult (i.e. Bed mobility, Transfers, Gait). Score 24 23 22-20 19-15 14-10 9-7 6     Modifier CH CI CJ CK CL CM CN      ?  Mobility - Walking and Moving Around:     - CURRENT STATUS: CL - 60%-79% impaired, limited or restricted    - GOAL STATUS: CJ - 20%-39% impaired, limited or restricted    - D/C STATUS:  ---------------To be determined---------------  [place] / Product Type: Medicare /      Medical Necessity:     · Patient is expected to demonstrate progress in strength, range of motion, balance, coordination and functional technique to decrease assistance required with functional mobility and shoulder exercises. Reason for Services/Other Comments:  · Patient continues to require skilled intervention due to inability to complete functional mobility and shoulder exercises independently. Use of outcome tool(s) and clinical judgement create a POC that gives a: Clear prediction of patient's progress: LOW COMPLEXITY            TREATMENT:   (In addition to Assessment/Re-Assessment sessions the following treatments were rendered)   Pre-treatment Symptoms/Complaints:  Feeling better  Pain: Initial: having knee pain, but did not rate  Pain Intensity 1: 4  Post Session:       Therapeutic Exercise: (15 Minutes):  Exercises per grid below to improve mobility and strength. Required minimal verbal and manual cues to promote proper body alignment and promote proper body posture. Progressed repetitions as indicated. Therapeutic Activity: (10 Minutes   ):  Therapeutic activities such as bed mobility and exercises.      Date:  8/20/18 Date:   Date:     ACTIVITY/EXERCISE AM PM AM PM AM PM   Gripping 10 12       Wrist Flexion/Extension 10 12       Wrist Ulnar/Radial Deviation         Pronation/Supination 10 12       Elbow Flexion/Extension 10 12       Shoulder Flexion/Extension 10 P 12 P       Shoulder AB/ADduction         Shoulder IR/ER         Pulleys         Pendulums         Shrugs 10 12          Isometric:                 Flexion 5 12       Extension 5 12       ABduction         ADduction         Biceps/Triceps                  B = bilateral; AA = active assistive; A = active; P = passive  Education:  [x]  Home Exercises  []  Sling Application   []  Movement Precautions   []  Pulleys   []  Use of Ice   []  Other:   Treatment/Session Assessment:    · Response to Treatment:  Tolerated fair,   · Interdisciplinary Collaboration:   o Registered Nurse  o Rehabilitation Attendant  · After treatment position/precautions:   o Supine in bed  o Bed/Chair-wheels locked  o Bed in low position  o Call light within reach  o Family at bedside   · Compliance with Program/Exercises: compliant all of the time. · Recommendations/Intent for next treatment session:  Treatment next visit will focus on increasing Ms. Lawson's independence with bed mobility, transfers, gait training, strength/ROM exercises, modalities for pain, and patient education.    Total Treatment Duration:  PT Patient Time In/Time Out  Time In: 1410  Time Out: Manuel Paulson [address], PTA

## 2018-12-21 VITALS
SYSTOLIC BLOOD PRESSURE: 123 MMHG | TEMPERATURE: 98 F | DIASTOLIC BLOOD PRESSURE: 77 MMHG | OXYGEN SATURATION: 93 % | RESPIRATION RATE: 16 BRPM | WEIGHT: 285 LBS | BODY MASS INDEX: 52.44 KG/M2 | HEART RATE: 87 BPM | HEIGHT: 62 IN

## 2018-12-21 LAB
HCT VFR BLD AUTO: 28.6 % (ref 35.8–46.3)
INR PPP: 1.6
PROTHROMBIN TIME: 18.9 SEC (ref 11.7–14.5)

## 2018-12-21 PROCEDURE — 97110 THERAPEUTIC EXERCISES: CPT

## 2018-12-21 PROCEDURE — 85610 PROTHROMBIN TIME: CPT

## 2018-12-21 PROCEDURE — 85014 HEMATOCRIT: CPT

## 2018-12-21 PROCEDURE — 97530 THERAPEUTIC ACTIVITIES: CPT

## 2018-12-21 PROCEDURE — 36415 COLL VENOUS BLD VENIPUNCTURE: CPT

## 2018-12-21 PROCEDURE — 74011250637 HC RX REV CODE- 250/637: Performed by: ORTHOPAEDIC SURGERY

## 2018-12-21 PROCEDURE — 99239 HOSP IP/OBS DSCHRG MGMT >30: CPT | Performed by: PHYSICAL MEDICINE & REHABILITATION

## 2018-12-21 RX ADMIN — BUSPIRONE HYDROCHLORIDE 5 MG: 5 TABLET ORAL at 08:20

## 2018-12-21 RX ADMIN — OXYBUTYNIN CHLORIDE 5 MG: 5 TABLET, EXTENDED RELEASE ORAL at 08:20

## 2018-12-21 RX ADMIN — METOPROLOL SUCCINATE 25 MG: 25 TABLET, EXTENDED RELEASE ORAL at 08:20

## 2018-12-21 RX ADMIN — AMIODARONE HYDROCHLORIDE 200 MG: 200 TABLET ORAL at 08:20

## 2018-12-21 RX ADMIN — PANTOPRAZOLE SODIUM 40 MG: 40 TABLET, DELAYED RELEASE ORAL at 05:31

## 2018-12-21 RX ADMIN — HYDROCODONE BITARTRATE AND ACETAMINOPHEN 1 TABLET: 5; 325 TABLET ORAL at 11:56

## 2018-12-21 RX ADMIN — ASPIRIN 81 MG: 81 TABLET, COATED ORAL at 08:19

## 2018-12-21 RX ADMIN — ALLOPURINOL 300 MG: 300 TABLET ORAL at 08:20

## 2018-12-21 RX ADMIN — HYDROCODONE BITARTRATE AND ACETAMINOPHEN 1 TABLET: 5; 325 TABLET ORAL at 08:19

## 2018-12-21 RX ADMIN — POTASSIUM CHLORIDE 20 MEQ: 1.5 POWDER, FOR SOLUTION ORAL at 08:23

## 2018-12-21 RX ADMIN — FOLIC ACID 1 MG: 1 TABLET ORAL at 08:20

## 2018-12-21 RX ADMIN — FLUOXETINE 20 MG: 20 CAPSULE ORAL at 08:20

## 2018-12-21 RX ADMIN — HYDROCODONE BITARTRATE AND ACETAMINOPHEN 1 TABLET: 5; 325 TABLET ORAL at 02:40

## 2018-12-21 RX ADMIN — CYCLOBENZAPRINE HYDROCHLORIDE 10 MG: 10 TABLET, FILM COATED ORAL at 08:19

## 2018-12-21 NOTE — PROGRESS NOTES
Patient in bed. Lungs clear/diminished to auscultation. Right shoulder incision dry and intact. Norco 5 mg given PO for pain of 8/10. Neurovascular status WDL. Palpable pulses. Strong equal  to bilateral hands. Instructed to call for assistance or any needs. Patient verbalized understanding. Call bell within reach. Side rails up x3. Bed low and locked. No distress noted.

## 2018-12-21 NOTE — PROGRESS NOTES
IV fluids infusing. Decreased rate from 100 to 75ml/hr r/t MD progress note to wean fluids as able. Will recheck and monitor BP.

## 2018-12-21 NOTE — PROGRESS NOTES
TRANSFER - OUT REPORT:    Verbal report given to [address] [place] [address] on Reji Petty  being transferred to [facility] for routine progression of care       Report consisted of patients Situation, Background, Assessment and   Recommendations(SBAR). Information from the following report(s) SBAR, Kardex, OR Summary, Procedure Summary, Intake/Output, MAR, Accordion and Recent Results was reviewed with the receiving nurse. Lines:       Opportunity for questions and clarification was provided.       Patient transported with:  ambulance

## 2018-12-21 NOTE — PROGRESS NOTES
Went to room to reassess pt's BP after decreased IV rate. Pt was asleep without signs of distress. /76 at present and O2 sat was 95% on room air as cl had removed her nasal cannula. Will continue to monitor.

## 2018-12-21 NOTE — PROGRESS NOTES
Problem: Mobility Impaired (Adult and Pediatric)  Goal: *Acute Goals and Plan of Care (Insert Text)  STG:  (1.)Ms. Raffy Huerta will move from supine to sit and sit to supine  with MINIMAL ASSIST within 4-7 treatment day(s). (2.)Ms. Raffy Huerta will transfer from bed to chair and chair to bed with MINIMAL ASSIST of 2 using the least restrictive device within 4-7 treatment day(s). (3.)Ms. Raffy Huerta will ambulate with MINIMAL ASSIST of 2 for 50 feet with the least restrictive device within 4-7 treatment day(s). LTG:  (1.)Ms. Raffy Huerta will move from supine to sit and sit to supine  in bed with CONTACT GUARD ASSIST within 7-14 treatment day(s). (2.)Ms. Raffy Huerta will transfer from bed to chair and chair to bed with MINIMAL ASSIST using the least restrictive device within 7-14 treatment day(s). (3.)Ms. Raffy Huerta will ambulate with MINIMAL ASSIST for 100 feet with the least restrictive device within 7-14 treatment day(s). ________________________________________________________________________________________________    PHYSICAL THERAPY: Daily Note, AM 12/21/2018  INPATIENT: Hospital Day: 3  [place] / Product Type: Medicare /      NAME/AGE/GENDER: Sarkis Wilkerson is a 77 y.o. female   PRIMARY DIAGNOSIS: Rotator cuff tear arthropathy of right shoulder [M75.101, M12.811] Rotator cuff tear arthropathy of right shoulder Rotator cuff tear arthropathy of right shoulder  Procedure(s) (LRB):  RIGHT SHOULDER ARTHROPLASTY TOTAL REVERSE  / [place] / INTERSCALENE IP CODE ONLY (Right)  2 Days Post-Op  ICD-10: Treatment Diagnosis:   · Pain in Right Shoulder (M25.511)  · Stiffness of Right Shoulder, Not elsewhere classified (M25.611)  · Generalized Muscle Weakness (M62.81)  · Difficulty in walking, Not elsewhere classified (R26.2)   Precaution/Allergies:  Patient has no known allergies. ASSESSMENT:     Ms. Raffy Huerta presents supine on contact s/p right shoulder arthroplasty reverse.  At home she was independent with gait but limited to short distances due to right knee pain (states she has a torn meniscus but has been told she is not a candidate for surgery). Pt needed significant assistance to move from supine to sit-now that her right upper extremity has had surgery and she was not able to rely on her right arm for mobility. Also needed moderate assist of one to 2 people to move from sit to stand and to take a few steps by the bedside. Helped her lay back down with assist of 2 people due to her low BP and RN wanted us to be a little cautious this morning. She did well with her shoulder exercises with verbal cues although admits her shoulder still may be a little numb. Talked with SW about options for rehab, pt would not be safe to be up without assistance. Pt left in supine with ice on shoulder and needs in reach. Hope to progress at next treatment session. She is supine upon arrival.  Therapist and pt performs and review exercises. Work on bed mobility as follows: rolling from L><R x 2 in order to get pt to [facility]. She continue to make slow and steady progress. Leaving for rehab around 12:30. This section established at most recent assessment   PROBLEM LIST (Impairments causing functional limitations):  1. Decreased [place] with Bed Mobility  2. Decreased [place] with Transfers  3. Decreased [place] with Ambulation   4. Decreased [place] with shoulder HEP   INTERVENTIONS PLANNED: (Benefits and precautions of physical therapy have been discussed with the patient.)  1. Bed Mobility Training  2. Transfer Training  3. Gait Training  4. Therapeutic Exercises per MD orders  5. Modalities for Pain     TREATMENT PLAN: Frequency/Duration: twice daily for duration of hospital stay  Rehabilitation Potential For Stated Goals: Good     RECOMMENDED REHABILITATION/EQUIPMENT: (at time of discharge pending progress): Continue Skilled Therapy, Rehab and Discussed with Case Management. HISTORY:   History of Present Injury/Illness (Reason for Referral):  Pt s/p above procedure on 12/20/18  Past Medical History/Comorbidities:   Ms. Nichole Osorio  has a past medical history of Anxiety, Arthritis, Atrial fibrillation ([address] Cancer ([address] Chronic pain, Diabetes ([address] Dysphagia, Edema, GERD (gastroesophageal reflux disease), Gout, Hypertension, Morbid obesity ([address] Murmur, cardiac, Nausea & vomiting, Overactive bladder, Psychiatric disorder, Restless leg syndrome, UTI (urinary tract infection), and Venous stasis. Ms. Nichole Osorio  has a past surgical history that includes hx cholecystectomy; hx orthopaedic (Right); hx heent (09/2016); hx tonsillectomy; hx cataract removal (Bilateral); hx hysterectomy; hx tubal ligation; pr breast surgery procedure unlisted; hx orthopaedic; hx heent; RIGHT SHOULDER ARTHROPLASTY TOTAL Max Sauers / Paula Candelario / INTERSCALENE IP CODE ONLY (Right, 12/19/2018); RIGHT ARTHROSCOPIC BICEPS TENOTOMY and ACROMIOPLASTY (Right, 1/5/2018); and LEFT SHOULDER ARTHROPLASTY MOISES/ EXACTECH WITH CTA HEAD (Left, 10/18/2016). Social History/Living Environment:   Home Environment: Private residence  Wheelchair Ramp: Yes  One/Two Story Residence: One story  Living Alone: No  Support Systems: Family member(s), Spouse/Significant Other/Partner  Patient Expects to be Discharged to[de-identified] Private residence  Current DME Used/Available at Home: [address] [place], straight, [place]  Tub or Shower Type: Tub/Shower combination  Prior Level of Function/Work/Activity:  Pt living at home with spouse, limited household ambulator. Number of Personal Factors/Comorbidities that affect the Plan of Care: 1-2: MODERATE COMPLEXITY   EXAMINATION:   Most Recent Physical Functioning:   Gross Assessment:                  Posture:     Balance:    Bed Mobility:  Rolling: Moderate assistance;Assist x2  Duration: 10 Minutes  Wheelchair Mobility:     Transfers:     Gait:            Body Structures Involved:  1. Joints  2.  Muscles Body Functions Affected:  1. Movement Related Activities and Participation Affected:  1. Mobility  2. Self Care   Number of elements that affect the Plan of Care: 4+: HIGH COMPLEXITY   CLINICAL PRESENTATION:   Presentation: Stable and uncomplicated: LOW COMPLEXITY   CLINICAL DECISION MAKING:   [facility] AM-PAC 6 Clicks   Basic Mobility Inpatient Short Form  How much difficulty does the patient currently have. .. Unable A Lot A Little None   1. Turning over in bed (including adjusting bedclothes, sheets and blankets)? [] 1   [x] 2   [] 3   [] 4   2. Sitting down on and standing up from a chair with arms ( e.g., wheelchair, bedside commode, etc.)   [] 1   [x] 2   [] 3   [] 4   3. Moving from lying on back to sitting on the side of the bed? [] 1   [x] 2   [] 3   [] 4   How much help from another person does the patient currently need. .. Total A Lot A Little None   4. Moving to and from a bed to a chair (including a wheelchair)? [] 1   [x] 2   [] 3   [] 4   5. Need to walk in hospital room? [] 1   [x] 2   [] 3   [] 4   6. Climbing 3-5 steps with a railing? [] 1   [x] 2   [] 3   [] 4   © 2007, Trustees of [facility], under license to [employer]. All rights reserved      Score:  Initial: 12 Most Recent: X (Date: -- )    Interpretation of Tool:  Represents activities that are increasingly more difficult (i.e. Bed mobility, Transfers, Gait). Score 24 23 22-20 19-15 14-10 9-7 6     Modifier CH CI CJ CK CL CM CN      ?  Mobility - Walking and Moving Around:     - CURRENT STATUS: CL - 60%-79% impaired, limited or restricted    - GOAL STATUS: CJ - 20%-39% impaired, limited or restricted    - D/C STATUS:  ---------------To be determined---------------  [place] / Product Type: Medicare /      Medical Necessity:     · Patient is expected to demonstrate progress in strength, range of motion, balance, coordination and functional technique to decrease assistance required with functional mobility and shoulder exercises. Reason for Services/Other Comments:  · Patient continues to require skilled intervention due to inability to complete functional mobility and shoulder exercises independently. Use of outcome tool(s) and clinical judgement create a POC that gives a: Clear prediction of patient's progress: LOW COMPLEXITY            TREATMENT:   (In addition to Assessment/Re-Assessment sessions the following treatments were rendered)   Pre-treatment Symptoms/Complaints:  Feeling better  Pain: Initial: having knee pain, but did not rate  Pain Intensity 1: 0  Post Session:       Therapeutic Exercise: (15 Minutes):  Exercises per grid below to improve mobility and strength. Required minimal verbal and manual cues to promote proper body alignment and promote proper body posture. Progressed repetitions as indicated. Therapeutic Activity: (10 Minutes   ):  Therapeutic activities such as bed mobility and exercises.      Date:  8/20/18 Date:  8/21   Date:     ACTIVITY/EXERCISE AM PM AM PM AM PM   Gripping 10 12 12      Wrist Flexion/Extension 10 12 12      Wrist Ulnar/Radial Deviation         Pronation/Supination 10 12 12      Elbow Flexion/Extension 10 12 12      Shoulder Flexion/Extension 10 P 12 P  12 P      Shoulder AB/ADduction         Shoulder IR/ER         Pulleys         Pendulums         Shrugs 10 12    12      Isometric:                 Flexion 5 12 12      Extension 5 12 12      ABduction         ADduction         Biceps/Triceps                  B = bilateral; AA = active assistive; A = active; P = passive  Education:  [x]  Home Exercises  []  Sling Application   []  Movement Precautions   []  Pulleys   []  Use of Ice   []  Other:   Treatment/Session Assessment:    · Response to Treatment: doing well  · Interdisciplinary Collaboration:   o Registered Nurse  o Rehabilitation Attendant  · After treatment position/precautions:   o Supine in bed  o Bed/Chair-wheels locked  o Bed in low position  o Call light within reach  o Family at bedside   · Compliance with Program/Exercises: compliant all of the time. · Recommendations/Intent for next treatment session:  Treatment next visit will focus on increasing Ms. Lawson's independence with bed mobility, transfers, gait training, strength/ROM exercises, modalities for pain, and patient education.    Total Treatment Duration:  PT Patient Time In/Time Out  Time In: 0915  Time Out: 0940    Samia Alejandro, PTA

## 2018-12-21 NOTE — PROGRESS NOTES
Pt was sleeping when this writer entered room. Pt wakened and received scheduled am med. IV infusing and rios draining. Pt's BP has increased since IV rate was decreased. Pain med was received at 0240 and will offer next dose about 0630 as cl reports that she did receive some relief from the pain med but cl grimacing at times and reports pain in both R shoulder and leg. Cl encouraged to call for any needs.

## 2018-12-21 NOTE — PROGRESS NOTES
Pt alert and oriented. Resting in bed with tv on. Shift assessment completed. Cl notes passing gas but no BM since hospitalization. R shoulder incision is dry and intact. Cl c/o pain in leg that is more bothersome than her shoulder. IV fluids infusing and cl has rios intact draining clear yellow urine. Call light within reach. Cl reminded to use IS and encouraged to call for any needs/concerns.

## 2018-12-29 NOTE — OP NOTES
[facility] 
OPERATIVE REPORT Ethan Mendoza 
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 PROCEDURE PERFORMED:  Right shoulder arthroplasty, reverse, unusual and complex. SURGEON:  Indiana Lacy MD 
 
ASSISTANT:  ***  
 
ANESTHESIA:  *** SPECIMENS REMOVED:  *** COMPLICATIONS:  *** IMPLANTS:   *** FINDINGS:  The case is unusual and complex because of the patient's body mass index of 52. Because of that morbid obesity, every aspect of the case was more difficult. The case took longer. Blood loss was greater. Positioning and identification of critical structures was made more complex by the obesity. The humeral head articulating with the undersurface of the acromion. There was almost no subscapularis remaining. There was no rotator cuff tendon attached to the greater tuberosity. At the completion of the arthroplasty, I could not dislocate her shoulder. PROCEDURE IN DETAIL:  In the operating room, she was anesthetized. In the beach chair position, her right shoulder was prepped and draped in a sterile fashion. A deltopectoral incision was made through the subcutaneous fat layer. The deltopectoral interval was bluntly developed and the cephalic vein was retracted laterally. A thin remnant of subscapularis was transected at its insertion to the lesser tuberosity. The shoulder was dislocated. I transected the proximal humerus using an appropriate guide secured with smooth pins using a saw cut. I reamed the medullary canal, refined the cut with a rotary reamer and broached. I then impacted an [employer] humeral component in place with excellent interference fit.   A hole was drilled in the glenoid using the appropriate guide.  This was used to guide rotary reamers that reamed to a concentric hard bone surface in the glenoid. Next, a guide was used to drill a central hole for the prosthesis central peg. The baseplate was filled with bone graft taken from the humeral head. The baseplate was impacted in place with an excellent fit. Screws were placed at 4 o'clock and 8 o'clock with excellent cortical bite. I then secured the glenosphere to the baseplate. The proximal humerus delivered in the field and a trial secured to the humerus. Component sizes here were chosen by trial reduction. The actual poly was then secured to the humerus after torquing the bolt to the correct value. The joint was reduced. I could move it through a full range and could not dislocate it. Strong traction would create about 2 mm of distraction across the joint when it was in neutral position. I irrigated more and then I closed the deltopectoral interval with running Vicryl. The subcutaneous was closed with interrupted Monocryl, subcuticular Monocryl and surgical glue on the skin. She tolerated the procedure well. Her blood loss was estimated at 300 mL. There were no specimens sent. She left to the recovery room in good condition. Zhen Martinez MD 
  
 
[facility] / Farhan Pickens 
D: 12/28/2018 15:33    
T: 12/29/2018 06:07 JOB #: [id number] CC: Jose Pearce MD

## 2018-12-31 NOTE — OP NOTES
[facility] REPORT    Maryann Goodwin  MR#: [id number]  : 1950  ACCOUNT #: [de-identified]   DATE OF SERVICE: 2018    PREOPERATIVE DIAGNOSIS:  Cuff tear arthropathy of the right shoulder. 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 PROCEDURE PERFORMED:  Right shoulder arthroplasty, reverse, unusual and complex. SURGEON:  Marce Jackson MD    ASSISTANT:       ANESTHESIA:      SPECIMENS REMOVED:      COMPLICATIONS:  none    IMPLANTS:       FINDINGS:  The case is unusual and complex because of the patient's body mass index of 52. Because of her morbid obesity, every aspect of the case was more difficult. The case took longer. Blood loss was greater. Positioning and identification of critical structures was made more complex by the obesity. The humeral head articulating with the undersurface of the acromion. There was almost no subscapularis remaining. There was no rotator cuff tendon attached to the greater tuberosity. At the completion of the arthroplasty, I could not dislocate her shoulder. PROCEDURE IN DETAIL:  In the operating room, she was anesthetized. In the beach chair position, her right shoulder was prepped and draped in a sterile fashion. A deltopectoral incision was made through the subcutaneous fat layer. The deltopectoral interval was bluntly developed and the cephalic vein was retracted laterally. A thin remnant of subscapularis was transected at its insertion to the lesser tuberosity. The shoulder was dislocated. I transected the proximal humerus using an appropriate guide secured with smooth pins using a saw cut. I reamed the medullary canal, refined the cut with a rotary reamer and broached. I then impacted an [employer] humeral component in place with excellent interference fit. A hole was drilled in the glenoid using the appropriate guide.   This was used to guide rotary reamers that reamed to a concentric hard bone surface in the glenoid. Next, a guide was used to drill a central hole for the prosthesis central peg. The baseplate was filled with bone graft taken from the humeral head. The baseplate was impacted in place with an excellent fit. Screws were placed at 4 o'clock and 8 o'clock with excellent cortical bite. I then secured the glenosphere to the baseplate. The proximal humerus was delivered into the field and a trial secured to the humerus. Component sizes here were chosen by trial reduction. The actual poly was then secured to the humerus after torquing the bolt to the correct value. The joint was reduced. I could move it through a full range and could not dislocate it. Strong traction would create about 2 mm of distraction across the joint when it was in neutral position. I irrigated more and then I closed the deltopectoral interval with running Vicryl. The subcutaneous was closed with interrupted Monocryl, subcuticular Monocryl and surgical glue on the skin. She tolerated the procedure well. Her blood loss was estimated at 300 mL. There were no specimens sent. She left to the recovery room in good condition.       MD Anu Lucas / Branden Patel  D: 12/28/2018 15:33     T: 12/29/2018 06:07  JOB #: [id number]  CC: Geeta Boyle MD

## 2020-07-31 ENCOUNTER — HOSPITAL ENCOUNTER (OUTPATIENT)
Dept: SURGERY | Age: 70
Discharge: HOME | End: 2020-07-31
Payer: MEDICARE

## 2020-07-31 VITALS
HEART RATE: 78 BPM | OXYGEN SATURATION: 98 % | BODY MASS INDEX: 57.52 KG/M2 | DIASTOLIC BLOOD PRESSURE: 88 MMHG | SYSTOLIC BLOOD PRESSURE: 143 MMHG | WEIGHT: 293 LBS | RESPIRATION RATE: 16 BRPM | TEMPERATURE: 97.3 F | HEIGHT: 60 IN

## 2020-07-31 LAB
ANION GAP SERPL CALC-SCNC: 5 MMOL/L (ref 7–16)
BACTERIA SPEC CULT: ABNORMAL
BUN SERPL-MCNC: 18 MG/DL (ref 8–23)
CALCIUM SERPL-MCNC: 9 MG/DL (ref 8.3–10.4)
CHLORIDE SERPL-SCNC: 105 MMOL/L (ref 98–107)
CO2 SERPL-SCNC: 31 MMOL/L (ref 21–32)
CREAT SERPL-MCNC: 0.79 MG/DL (ref 0.6–1)
ERYTHROCYTE [DISTWIDTH] IN BLOOD BY AUTOMATED COUNT: 16.4 % (ref 11.9–14.6)
GLUCOSE BLD STRIP.AUTO-MCNC: 139 MG/DL (ref 65–100)
GLUCOSE SERPL-MCNC: 92 MG/DL (ref 65–100)
HCT VFR BLD AUTO: 35.3 % (ref 35.8–46.3)
HGB BLD-MCNC: 10.4 G/DL (ref 11.7–15.4)
MCH RBC QN AUTO: 27.2 PG (ref 26.1–32.9)
MCHC RBC AUTO-ENTMCNC: 29.5 G/DL (ref 31.4–35)
MCV RBC AUTO: 92.2 FL (ref 79.6–97.8)
NRBC # BLD: 0 K/UL (ref 0–0.2)
PLATELET # BLD AUTO: 271 K/UL (ref 150–450)
PMV BLD AUTO: 11 FL (ref 9.4–12.3)
POTASSIUM SERPL-SCNC: 3.6 MMOL/L (ref 3.5–5.1)
RBC # BLD AUTO: 3.83 M/UL (ref 4.05–5.2)
SERVICE CMNT-IMP: ABNORMAL
SODIUM SERPL-SCNC: 141 MMOL/L (ref 136–145)
WBC # BLD AUTO: 8.2 K/UL (ref 4.3–11.1)

## 2020-07-31 PROCEDURE — 82962 GLUCOSE BLOOD TEST: CPT

## 2020-07-31 PROCEDURE — 87641 MR-STAPH DNA AMP PROBE: CPT

## 2020-07-31 PROCEDURE — 85027 COMPLETE CBC AUTOMATED: CPT

## 2020-07-31 PROCEDURE — 80048 BASIC METABOLIC PNL TOTAL CA: CPT

## 2020-07-31 PROCEDURE — 36415 COLL VENOUS BLD VENIPUNCTURE: CPT

## 2020-07-31 RX ORDER — LEVOTHYROXINE SODIUM 50 UG/1
50 TABLET ORAL DAILY
COMMUNITY
Start: 2019-10-11

## 2020-07-31 NOTE — PERIOP NOTES
PLEASE CONTINUE TAKING ALL PRESCRIPTION MEDICATIONS UP TO THE DAY OF SURGERY UNLESS OTHERWISE DIRECTED BELOW. DISCONTINUE all vitamins and supplements 21 days prior to surgery. DISCONTINUE Non-Steriodal Anti-Inflammatory (NSAIDS) such as Advil and Aleve 5 days prior to surgery. Home Medications to take  the day of surgery    Aspirin                                              Busparine   Allopurinol                                         Omeprazole   Amiodarone                                        Levothyroxine     Home Medications   to Hold   Meloxicam hold for 5 days prior to surgery   Coumadin (Warfarin) hold for 5 days prior to surgery     Comments    Bring non formulary Omeprazole (in bottle )   BRing Incentive Spirometer and remaining Dynahex solution   *Visitor policy of 1 visitor per patient discussed. Please do not bring home medications with you on the day of surgery unless otherwise directed by your nurse. If you are instructed to bring home medications, please give them to your nurse as they will be administered by the nursing staff. If you have any questions, please call [address] [place] [address] [phone] or [address] [place] [address] [phone]. A copy of this note was provided to the patient for reference.

## 2020-07-31 NOTE — PERIOP NOTES
Patient verified name and . Order for consent NOT found in EHR and unable to match consent with case posting; patient verified. Type 3 surgery, walk in assessment complete. Labs per surgeon: unknown;  no orders ; Labs per anesthesia protocol: CBC, BMP; type and screen on DOS; SQBS: 139  EKG:completed today per protocol~abnormal.  Placed on chart for anesthesia review. Call placed and message left with medical records at [address] [phone]~[id number] pt sees Dr Park Waller. REquest for recent ECHO 9/10/18; EKG 19. Most recent office note 20 printed and placed with anesthesia summary to show anesthesia when other records arrive. Pt states that she has cardiology appointment on 20~will follow up with any updates. Call placed to pt's pcp, Dr. Caprice Yang [phone] for clearance on holding Warfarin for 5 days prior to surgery. Office closed and unable to leave message. Message left for Charge RN to follow up in Chart . A negative Covid swab result is required to proceed with surgery; The testing center is located at the [address], [place]. An appointment is required therefore the patient will be contacted by the Covid swab team. The testing clinic is closed from  for lunch and on weekends. For questions or concerns the patient should call [phone]. Appointment date/time 20 0900 found in EHR and provided to patient. MRSA/MSSA swab collected; pharmacy to review and dose antibiotic as appropriate. Hospital approved surgical skin cleanser and instructions to return bottle on DOS given per hospital policy. Patient provided with handouts including Guide to Surgery, Pain Management, Hand Hygiene, Blood Transfusion Education, and [facility] Brochure. Patient answered medical/surgical history questions at their best of ability.  All prior to admission medications documented in [facility]. Original medication prescription bottle NOT visualized during patient appointment. Patient instructed to hold all vitamins 3 weeks prior to surgery and NSAIDS 5 days prior to surgery. Patient teach back successful and patient demonstrates knowledge of instruction.

## 2020-07-31 NOTE — PERIOP NOTES
Office note, EKG, ECO and stress all obtained from [facility] and placed in packet for anesthesia review.

## 2020-07-31 NOTE — PERIOP NOTES
Pt states that she has 5-6 open weeping areas on her legs; one small area on her left hip that is oozing, also. Pt is dressed in leggings and is unable to stand/unsteady on feet, so am unable to assess at this time. Message left for Vibha Taylor, Dr Nicole Julien medical assistant in order to make them aware.

## 2020-08-03 NOTE — PERIOP NOTES
Labs done 7/31/20 within [facility] protocols for surgery    Results for Pooja Coombs (MRN [id number]) as of 8/3/2020 08:42   Ref.  Range 7/31/2020 13:44   WBC Latest Ref Range: 4.3 - 11.1 K/uL 8.2   RBC Latest Ref Range: 4.05 - 5.2 M/uL 3.83 (L)   HGB Latest Ref Range: 11.7 - 15.4 g/dL 10.4 (L)   HCT Latest Ref Range: 35.8 - 46.3 % 35.3 (L)   MCV Latest Ref Range: 79.6 - 97.8 FL 92.2   MCH Latest Ref Range: 26.1 - 32.9 PG 27.2   MCHC Latest Ref Range: 31.4 - 35.0 g/dL 29.5 (L)   RDW Latest Ref Range: 11.9 - 14.6 % 16.4 (H)   PLATELET Latest Ref Range: 150 - 450 K/uL 271   MPV Latest Ref Range: 9.4 - 12.3 FL 11.0   ABSOLUTE NRBC Latest Ref Range: 0.0 - 0.2 K/uL 0.00   Sodium Latest Ref Range: 136 - 145 mmol/L 141   Potassium Latest Ref Range: 3.5 - 5.1 mmol/L 3.6   Chloride Latest Ref Range: 98 - 107 mmol/L 105   CO2 Latest Ref Range: 21 - 32 mmol/L 31   Anion gap Latest Ref Range: 7 - 16 mmol/L 5 (L)   Glucose Latest Ref Range: 65 - 100 mg/dL 92   BUN Latest Ref Range: 8 - 23 MG/DL 18   Creatinine Latest Ref Range: 0.6 - 1.0 MG/DL 0.79   Calcium Latest Ref Range: 8.3 - 10.4 MG/DL 9.0   GFR est non-AA Latest Ref Range: >60 ml/min/1.73m2 >60   GFR est AA Latest Ref Range: >60 ml/min/1.73m2 >60

## 2020-08-06 RX ORDER — CEFAZOLIN SODIUM/WATER 2 G/20 ML
2 SYRINGE (ML) INTRAVENOUS ONCE
Status: CANCELLED | OUTPATIENT
Start: 2020-08-06 | End: 2020-08-06

## 2020-08-06 NOTE — PERIOP NOTES
Per anesthesia 8/5/20 cardiology office note to be obtained and reviewed. Per cardiology office note dated 8/5/20 found in care everywhere- Patient assessed to be stable from a cardiac standpoint. I have consulted with Dr. Ursula Lang, and Alex Belkis is assessed to have low cardiac risk for surgery.

## 2020-08-10 ENCOUNTER — ANESTHESIA EVENT (OUTPATIENT)
Dept: SURGERY | Age: 70
DRG: 483 | End: 2020-08-10
Payer: MEDICARE

## 2020-08-11 ENCOUNTER — ANESTHESIA (OUTPATIENT)
Dept: SURGERY | Age: 70
DRG: 483 | End: 2020-08-11
Payer: MEDICARE

## 2020-08-11 ENCOUNTER — APPOINTMENT (OUTPATIENT)
Dept: GENERAL RADIOLOGY | Age: 70
DRG: 483 | End: 2020-08-11
Attending: ORTHOPAEDIC SURGERY
Payer: MEDICARE

## 2020-08-11 ENCOUNTER — HOSPITAL ENCOUNTER (INPATIENT)
Age: 70
LOS: 2 days | Discharge: HOME | DRG: 483 | End: 2020-08-13
Attending: ORTHOPAEDIC SURGERY | Admitting: ORTHOPAEDIC SURGERY
Payer: MEDICARE

## 2020-08-11 DIAGNOSIS — T84.019A FAILURE OF ARTHROPLASTY, INITIAL ENCOUNTER (HCC): Primary | ICD-10-CM

## 2020-08-11 LAB
ABO + RH BLD: NORMAL
BLOOD GROUP ANTIBODIES SERPL: NORMAL
GLUCOSE BLD STRIP.AUTO-MCNC: 114 MG/DL (ref 65–100)
INR BLD: 1.4 (ref 0.9–1.2)
PT BLD: 16.7 SECS (ref 9.6–11.6)
SPECIMEN EXP DATE BLD: NORMAL

## 2020-08-11 PROCEDURE — 74011000250 HC RX REV CODE- 250: Performed by: NURSE ANESTHETIST, CERTIFIED REGISTERED

## 2020-08-11 PROCEDURE — C1713 ANCHOR/SCREW BN/BN,TIS/BN: HCPCS | Performed by: ORTHOPAEDIC SURGERY

## 2020-08-11 PROCEDURE — 74011250636 HC RX REV CODE- 250/636: Performed by: NURSE ANESTHETIST, CERTIFIED REGISTERED

## 2020-08-11 PROCEDURE — 74011250637 HC RX REV CODE- 250/637: Performed by: NURSE ANESTHETIST, CERTIFIED REGISTERED

## 2020-08-11 PROCEDURE — 77030037088 HC TUBE ENDOTRACH ORAL NSL COVD-A: Performed by: STUDENT IN AN ORGANIZED HEALTH CARE EDUCATION/TRAINING PROGRAM

## 2020-08-11 PROCEDURE — 76210000016 HC OR PH I REC 1 TO 1.5 HR: Performed by: ORTHOPAEDIC SURGERY

## 2020-08-11 PROCEDURE — 77030002913 HC SUT ETHBND J&J -B: Performed by: ORTHOPAEDIC SURGERY

## 2020-08-11 PROCEDURE — 77030040361 HC SLV COMPR DVT MDII -B: Performed by: ORTHOPAEDIC SURGERY

## 2020-08-11 PROCEDURE — 0RPJ0JZ REMOVAL OF SYNTHETIC SUBSTITUTE FROM RIGHT SHOULDER JOINT, OPEN APPROACH: ICD-10-PCS | Performed by: ORTHOPAEDIC SURGERY

## 2020-08-11 PROCEDURE — 77010033678 HC OXYGEN DAILY

## 2020-08-11 PROCEDURE — 87075 CULTR BACTERIA EXCEPT BLOOD: CPT

## 2020-08-11 PROCEDURE — 94762 N-INVAS EAR/PLS OXIMTRY CONT: CPT

## 2020-08-11 PROCEDURE — 85610 PROTHROMBIN TIME: CPT

## 2020-08-11 PROCEDURE — C1776 JOINT DEVICE (IMPLANTABLE): HCPCS | Performed by: ORTHOPAEDIC SURGERY

## 2020-08-11 PROCEDURE — 74011250636 HC RX REV CODE- 250/636: Performed by: STUDENT IN AN ORGANIZED HEALTH CARE EDUCATION/TRAINING PROGRAM

## 2020-08-11 PROCEDURE — 77030040361 HC SLV COMPR DVT MDII -B

## 2020-08-11 PROCEDURE — 76010010054 HC POST OP PAIN BLOCK: Performed by: ORTHOPAEDIC SURGERY

## 2020-08-11 PROCEDURE — 77030006777 HC BLD SAW OSC CNMD -B: Performed by: ORTHOPAEDIC SURGERY

## 2020-08-11 PROCEDURE — 77030003602 HC NDL NRV BLK BBMI -B: Performed by: STUDENT IN AN ORGANIZED HEALTH CARE EDUCATION/TRAINING PROGRAM

## 2020-08-11 PROCEDURE — 74011000250 HC RX REV CODE- 250: Performed by: ORTHOPAEDIC SURGERY

## 2020-08-11 PROCEDURE — 77030031139 HC SUT VCRL2 J&J -A: Performed by: ORTHOPAEDIC SURGERY

## 2020-08-11 PROCEDURE — 94002 VENT MGMT INPAT INIT DAY: CPT

## 2020-08-11 PROCEDURE — 76060000037 HC ANESTHESIA 3 TO 3.5 HR: Performed by: ORTHOPAEDIC SURGERY

## 2020-08-11 PROCEDURE — 76942 ECHO GUIDE FOR BIOPSY: CPT | Performed by: ORTHOPAEDIC SURGERY

## 2020-08-11 PROCEDURE — 76010000173 HC OR TIME 3 TO 3.5 HR INTENSV-TIER 1: Performed by: ORTHOPAEDIC SURGERY

## 2020-08-11 PROCEDURE — 0RRJ0JZ REPLACEMENT OF RIGHT SHOULDER JOINT WITH SYNTHETIC SUBSTITUTE, OPEN APPROACH: ICD-10-PCS | Performed by: ORTHOPAEDIC SURGERY

## 2020-08-11 PROCEDURE — 87205 SMEAR GRAM STAIN: CPT

## 2020-08-11 PROCEDURE — 65270000029 HC RM PRIVATE

## 2020-08-11 PROCEDURE — 82962 GLUCOSE BLOOD TEST: CPT

## 2020-08-11 PROCEDURE — 74011250636 HC RX REV CODE- 250/636: Performed by: ORTHOPAEDIC SURGERY

## 2020-08-11 PROCEDURE — 74011250637 HC RX REV CODE- 250/637: Performed by: ORTHOPAEDIC SURGERY

## 2020-08-11 PROCEDURE — 77030002933 HC SUT MCRYL J&J -A: Performed by: ORTHOPAEDIC SURGERY

## 2020-08-11 PROCEDURE — 86900 BLOOD TYPING SEROLOGIC ABO: CPT

## 2020-08-11 PROCEDURE — 73020 X-RAY EXAM OF SHOULDER: CPT

## 2020-08-11 PROCEDURE — 77030039425 HC BLD LARYNG TRULITE DISP TELE -A: Performed by: STUDENT IN AN ORGANIZED HEALTH CARE EDUCATION/TRAINING PROGRAM

## 2020-08-11 PROCEDURE — 77030040922 HC BLNKT HYPOTHRM STRY -A: Performed by: STUDENT IN AN ORGANIZED HEALTH CARE EDUCATION/TRAINING PROGRAM

## 2020-08-11 PROCEDURE — A4565 SLINGS: HCPCS | Performed by: ORTHOPAEDIC SURGERY

## 2020-08-11 DEVICE — SCREW BNE AD PED L47MM DIA15MM CANC SHLDR NONLOCKING: Type: IMPLANTABLE DEVICE | Site: SHOULDER | Status: FUNCTIONAL

## 2020-08-11 DEVICE — IMPLANTABLE DEVICE: Type: IMPLANTABLE DEVICE | Site: SHOULDER | Status: FUNCTIONAL

## 2020-08-11 DEVICE — PLATE BNE 4.5MM ANAT REPLICATOR O/S EQUINOXE: Type: IMPLANTABLE DEVICE | Site: SHOULDER | Status: FUNCTIONAL

## 2020-08-11 RX ORDER — CEFAZOLIN SODIUM/WATER 2 G/20 ML
2 SYRINGE (ML) INTRAVENOUS EVERY 8 HOURS
Status: COMPLETED | OUTPATIENT
Start: 2020-08-11 | End: 2020-08-12

## 2020-08-11 RX ORDER — ALBUTEROL SULFATE 90 UG/1
AEROSOL, METERED RESPIRATORY (INHALATION) AS NEEDED
Status: DISCONTINUED | OUTPATIENT
Start: 2020-08-11 | End: 2020-08-11 | Stop reason: HOSPADM

## 2020-08-11 RX ORDER — DIPHENHYDRAMINE HYDROCHLORIDE 50 MG/ML
12.5 INJECTION, SOLUTION INTRAMUSCULAR; INTRAVENOUS
Status: DISCONTINUED | OUTPATIENT
Start: 2020-08-11 | End: 2020-08-11 | Stop reason: HOSPADM

## 2020-08-11 RX ORDER — HYDROMORPHONE HYDROCHLORIDE 2 MG/ML
0.5 INJECTION, SOLUTION INTRAMUSCULAR; INTRAVENOUS; SUBCUTANEOUS
Status: DISCONTINUED | OUTPATIENT
Start: 2020-08-11 | End: 2020-08-11 | Stop reason: HOSPADM

## 2020-08-11 RX ORDER — ROCURONIUM BROMIDE 10 MG/ML
INJECTION, SOLUTION INTRAVENOUS AS NEEDED
Status: DISCONTINUED | OUTPATIENT
Start: 2020-08-11 | End: 2020-08-11 | Stop reason: HOSPADM

## 2020-08-11 RX ORDER — LEVOTHYROXINE SODIUM 50 UG/1
50 TABLET ORAL
Status: DISCONTINUED | OUTPATIENT
Start: 2020-08-12 | End: 2020-08-13 | Stop reason: HOSPADM

## 2020-08-11 RX ORDER — NALOXONE HYDROCHLORIDE 0.4 MG/ML
0.1 INJECTION, SOLUTION INTRAMUSCULAR; INTRAVENOUS; SUBCUTANEOUS AS NEEDED
Status: DISCONTINUED | OUTPATIENT
Start: 2020-08-11 | End: 2020-08-11 | Stop reason: HOSPADM

## 2020-08-11 RX ORDER — SODIUM CHLORIDE 0.9 % (FLUSH) 0.9 %
5-40 SYRINGE (ML) INJECTION EVERY 8 HOURS
Status: DISCONTINUED | OUTPATIENT
Start: 2020-08-11 | End: 2020-08-11 | Stop reason: HOSPADM

## 2020-08-11 RX ORDER — MELOXICAM 7.5 MG/1
15 TABLET ORAL
Status: DISCONTINUED | OUTPATIENT
Start: 2020-08-11 | End: 2020-08-13 | Stop reason: HOSPADM

## 2020-08-11 RX ORDER — SODIUM CHLORIDE, SODIUM LACTATE, POTASSIUM CHLORIDE, CALCIUM CHLORIDE 600; 310; 30; 20 MG/100ML; MG/100ML; MG/100ML; MG/100ML
100 INJECTION, SOLUTION INTRAVENOUS CONTINUOUS
Status: DISCONTINUED | OUTPATIENT
Start: 2020-08-11 | End: 2020-08-11 | Stop reason: HOSPADM

## 2020-08-11 RX ORDER — ALLOPURINOL 300 MG/1
300 TABLET ORAL DAILY
Status: DISCONTINUED | OUTPATIENT
Start: 2020-08-12 | End: 2020-08-13 | Stop reason: HOSPADM

## 2020-08-11 RX ORDER — OXYCODONE AND ACETAMINOPHEN 5; 325 MG/1; MG/1
1 TABLET ORAL
Status: DISCONTINUED | OUTPATIENT
Start: 2020-08-11 | End: 2020-08-13 | Stop reason: HOSPADM

## 2020-08-11 RX ORDER — LIDOCAINE HYDROCHLORIDE 10 MG/ML
0.1 INJECTION INFILTRATION; PERINEURAL AS NEEDED
Status: DISCONTINUED | OUTPATIENT
Start: 2020-08-11 | End: 2020-08-11 | Stop reason: HOSPADM

## 2020-08-11 RX ORDER — FLUMAZENIL 0.1 MG/ML
0.2 INJECTION INTRAVENOUS
Status: DISCONTINUED | OUTPATIENT
Start: 2020-08-11 | End: 2020-08-11 | Stop reason: HOSPADM

## 2020-08-11 RX ORDER — ASPIRIN 81 MG/1
81 TABLET ORAL DAILY
Status: DISCONTINUED | OUTPATIENT
Start: 2020-08-12 | End: 2020-08-13 | Stop reason: HOSPADM

## 2020-08-11 RX ORDER — AMIODARONE HYDROCHLORIDE 200 MG/1
100 TABLET ORAL DAILY
Status: DISCONTINUED | OUTPATIENT
Start: 2020-08-12 | End: 2020-08-13 | Stop reason: HOSPADM

## 2020-08-11 RX ORDER — DEXAMETHASONE SODIUM PHOSPHATE 4 MG/ML
INJECTION, SOLUTION INTRA-ARTICULAR; INTRALESIONAL; INTRAMUSCULAR; INTRAVENOUS; SOFT TISSUE
Status: DISCONTINUED | OUTPATIENT
Start: 2020-08-11 | End: 2020-08-11 | Stop reason: HOSPADM

## 2020-08-11 RX ORDER — SODIUM CHLORIDE 0.9 % (FLUSH) 0.9 %
5-40 SYRINGE (ML) INJECTION AS NEEDED
Status: CANCELLED | OUTPATIENT
Start: 2020-08-11

## 2020-08-11 RX ORDER — ONDANSETRON 2 MG/ML
INJECTION INTRAMUSCULAR; INTRAVENOUS AS NEEDED
Status: DISCONTINUED | OUTPATIENT
Start: 2020-08-11 | End: 2020-08-11 | Stop reason: HOSPADM

## 2020-08-11 RX ORDER — NALOXONE HYDROCHLORIDE 0.4 MG/ML
0.4 INJECTION, SOLUTION INTRAMUSCULAR; INTRAVENOUS; SUBCUTANEOUS
Status: DISCONTINUED | OUTPATIENT
Start: 2020-08-11 | End: 2020-08-13 | Stop reason: HOSPADM

## 2020-08-11 RX ORDER — ALBUTEROL SULFATE 0.83 MG/ML
2.5 SOLUTION RESPIRATORY (INHALATION)
Status: DISCONTINUED | OUTPATIENT
Start: 2020-08-11 | End: 2020-08-11 | Stop reason: HOSPADM

## 2020-08-11 RX ORDER — ACETAMINOPHEN 500 MG
1000 TABLET ORAL EVERY 6 HOURS
Status: DISCONTINUED | OUTPATIENT
Start: 2020-08-11 | End: 2020-08-13 | Stop reason: HOSPADM

## 2020-08-11 RX ORDER — MIDAZOLAM HYDROCHLORIDE 1 MG/ML
2 INJECTION, SOLUTION INTRAMUSCULAR; INTRAVENOUS ONCE
Status: COMPLETED | OUTPATIENT
Start: 2020-08-11 | End: 2020-08-11

## 2020-08-11 RX ORDER — ROPIVACAINE HYDROCHLORIDE 5 MG/ML
INJECTION, SOLUTION EPIDURAL; INFILTRATION; PERINEURAL
Status: DISCONTINUED | OUTPATIENT
Start: 2020-08-11 | End: 2020-08-11 | Stop reason: HOSPADM

## 2020-08-11 RX ORDER — POTASSIUM CHLORIDE 600 MG/1
8 CAPSULE, EXTENDED RELEASE ORAL 2 TIMES DAILY
Status: DISCONTINUED | OUTPATIENT
Start: 2020-08-11 | End: 2020-08-13 | Stop reason: HOSPADM

## 2020-08-11 RX ORDER — SODIUM CHLORIDE 0.9 % (FLUSH) 0.9 %
5-40 SYRINGE (ML) INJECTION EVERY 8 HOURS
Status: CANCELLED | OUTPATIENT
Start: 2020-08-11

## 2020-08-11 RX ORDER — HYDROMORPHONE HYDROCHLORIDE 1 MG/ML
1 INJECTION, SOLUTION INTRAMUSCULAR; INTRAVENOUS; SUBCUTANEOUS
Status: DISCONTINUED | OUTPATIENT
Start: 2020-08-11 | End: 2020-08-13 | Stop reason: HOSPADM

## 2020-08-11 RX ORDER — WARFARIN 2.5 MG/1
2.5 TABLET ORAL
Status: DISCONTINUED | OUTPATIENT
Start: 2020-08-11 | End: 2020-08-13 | Stop reason: HOSPADM

## 2020-08-11 RX ORDER — BUSPIRONE HYDROCHLORIDE 5 MG/1
5 TABLET ORAL 3 TIMES DAILY
Status: DISCONTINUED | OUTPATIENT
Start: 2020-08-11 | End: 2020-08-13 | Stop reason: HOSPADM

## 2020-08-11 RX ORDER — SODIUM CHLORIDE 0.9 % (FLUSH) 0.9 %
5-40 SYRINGE (ML) INJECTION AS NEEDED
Status: DISCONTINUED | OUTPATIENT
Start: 2020-08-11 | End: 2020-08-11 | Stop reason: HOSPADM

## 2020-08-11 RX ORDER — MIDAZOLAM HYDROCHLORIDE 1 MG/ML
INJECTION, SOLUTION INTRAMUSCULAR; INTRAVENOUS AS NEEDED
Status: DISCONTINUED | OUTPATIENT
Start: 2020-08-11 | End: 2020-08-11 | Stop reason: HOSPADM

## 2020-08-11 RX ORDER — PRAMIPEXOLE DIHYDROCHLORIDE 0.25 MG/1
0.5 TABLET ORAL
Status: DISCONTINUED | OUTPATIENT
Start: 2020-08-11 | End: 2020-08-13 | Stop reason: HOSPADM

## 2020-08-11 RX ORDER — FUROSEMIDE 40 MG/1
40 TABLET ORAL AS NEEDED
Status: DISCONTINUED | OUTPATIENT
Start: 2020-08-11 | End: 2020-08-11

## 2020-08-11 RX ORDER — METOPROLOL SUCCINATE 25 MG/1
25 TABLET, EXTENDED RELEASE ORAL DAILY
Status: DISCONTINUED | OUTPATIENT
Start: 2020-08-12 | End: 2020-08-11

## 2020-08-11 RX ORDER — PANTOPRAZOLE SODIUM 40 MG/1
40 TABLET, DELAYED RELEASE ORAL
Status: DISCONTINUED | OUTPATIENT
Start: 2020-08-12 | End: 2020-08-13 | Stop reason: HOSPADM

## 2020-08-11 RX ORDER — BUPIVACAINE HYDROCHLORIDE AND EPINEPHRINE 5; 5 MG/ML; UG/ML
INJECTION, SOLUTION EPIDURAL; INTRACAUDAL; PERINEURAL AS NEEDED
Status: DISCONTINUED | OUTPATIENT
Start: 2020-08-11 | End: 2020-08-11 | Stop reason: HOSPADM

## 2020-08-11 RX ORDER — WARFARIN SODIUM 5 MG/1
5 TABLET ORAL
Status: DISCONTINUED | OUTPATIENT
Start: 2020-08-12 | End: 2020-08-13 | Stop reason: HOSPADM

## 2020-08-11 RX ORDER — PROPOFOL 10 MG/ML
INJECTION, EMULSION INTRAVENOUS AS NEEDED
Status: DISCONTINUED | OUTPATIENT
Start: 2020-08-11 | End: 2020-08-11 | Stop reason: HOSPADM

## 2020-08-11 RX ORDER — GABAPENTIN 300 MG/1
600 CAPSULE ORAL
Status: DISCONTINUED | OUTPATIENT
Start: 2020-08-11 | End: 2020-08-13 | Stop reason: HOSPADM

## 2020-08-11 RX ORDER — LIDOCAINE HYDROCHLORIDE 20 MG/ML
INJECTION, SOLUTION EPIDURAL; INFILTRATION; INTRACAUDAL; PERINEURAL AS NEEDED
Status: DISCONTINUED | OUTPATIENT
Start: 2020-08-11 | End: 2020-08-11 | Stop reason: HOSPADM

## 2020-08-11 RX ORDER — EPHEDRINE SULFATE/0.9% NACL/PF 50 MG/5 ML
SYRINGE (ML) INTRAVENOUS AS NEEDED
Status: DISCONTINUED | OUTPATIENT
Start: 2020-08-11 | End: 2020-08-11 | Stop reason: HOSPADM

## 2020-08-11 RX ORDER — OXYCODONE HYDROCHLORIDE 5 MG/1
5 TABLET ORAL
Status: DISCONTINUED | OUTPATIENT
Start: 2020-08-11 | End: 2020-08-11 | Stop reason: HOSPADM

## 2020-08-11 RX ORDER — LISINOPRIL 20 MG/1
20 TABLET ORAL DAILY
Status: DISCONTINUED | OUTPATIENT
Start: 2020-08-12 | End: 2020-08-13 | Stop reason: HOSPADM

## 2020-08-11 RX ORDER — PHENYLEPHRINE 40 MG/250 ML (160 MCG/ML) IN 0.9 % SODIUM CHLORIDE IV
SOLUTION
Status: DISCONTINUED | OUTPATIENT
Start: 2020-08-11 | End: 2020-08-11 | Stop reason: HOSPADM

## 2020-08-11 RX ORDER — SODIUM CHLORIDE 9 MG/ML
100 INJECTION, SOLUTION INTRAVENOUS CONTINUOUS
Status: DISCONTINUED | OUTPATIENT
Start: 2020-08-11 | End: 2020-08-13 | Stop reason: HOSPADM

## 2020-08-11 RX ADMIN — SODIUM CHLORIDE 100 ML/HR: 9 INJECTION, SOLUTION INTRAVENOUS at 16:00

## 2020-08-11 RX ADMIN — Medication 10 MG: at 12:17

## 2020-08-11 RX ADMIN — PHENYLEPHRINE HYDROCHLORIDE 100 MCG: 10 INJECTION INTRAVENOUS at 12:01

## 2020-08-11 RX ADMIN — ALBUTEROL SULFATE 3 PUFF: 90 AEROSOL, METERED RESPIRATORY (INHALATION) at 14:23

## 2020-08-11 RX ADMIN — PHENYLEPHRINE HYDROCHLORIDE 100 MCG: 10 INJECTION INTRAVENOUS at 12:02

## 2020-08-11 RX ADMIN — ROCURONIUM BROMIDE 50 MG: 10 INJECTION, SOLUTION INTRAVENOUS at 11:37

## 2020-08-11 RX ADMIN — PHENYLEPHRINE HYDROCHLORIDE 100 MCG: 10 INJECTION INTRAVENOUS at 11:57

## 2020-08-11 RX ADMIN — SUGAMMADEX 200 MG: 100 INJECTION, SOLUTION INTRAVENOUS at 14:26

## 2020-08-11 RX ADMIN — ROCURONIUM BROMIDE 20 MG: 10 INJECTION, SOLUTION INTRAVENOUS at 12:47

## 2020-08-11 RX ADMIN — PRAMIPEXOLE DIHYDROCHLORIDE 0.5 MG: 0.25 TABLET ORAL at 21:34

## 2020-08-11 RX ADMIN — LIDOCAINE HYDROCHLORIDE 60 MG: 20 INJECTION, SOLUTION EPIDURAL; INFILTRATION; INTRACAUDAL; PERINEURAL at 11:37

## 2020-08-11 RX ADMIN — MIDAZOLAM 1 MG: 1 INJECTION INTRAMUSCULAR; INTRAVENOUS at 10:02

## 2020-08-11 RX ADMIN — Medication 10 MG: at 11:49

## 2020-08-11 RX ADMIN — CEFAZOLIN SODIUM 2 G: 100 INJECTION, POWDER, LYOPHILIZED, FOR SOLUTION INTRAVENOUS at 21:35

## 2020-08-11 RX ADMIN — PHENYLEPHRINE HYDROCHLORIDE 100 MCG: 10 INJECTION INTRAVENOUS at 12:38

## 2020-08-11 RX ADMIN — SODIUM CHLORIDE, SODIUM LACTATE, POTASSIUM CHLORIDE, AND CALCIUM CHLORIDE: 600; 310; 30; 20 INJECTION, SOLUTION INTRAVENOUS at 12:41

## 2020-08-11 RX ADMIN — Medication 10 MG: at 11:57

## 2020-08-11 RX ADMIN — PHENYLEPHRINE HYDROCHLORIDE 100 MCG: 10 INJECTION INTRAVENOUS at 12:34

## 2020-08-11 RX ADMIN — ROPIVACAINE HYDROCHLORIDE 30 ML: 150 INJECTION, SOLUTION EPIDURAL; INFILTRATION; PERINEURAL at 10:06

## 2020-08-11 RX ADMIN — SODIUM CHLORIDE, SODIUM LACTATE, POTASSIUM CHLORIDE, AND CALCIUM CHLORIDE 100 ML/HR: 600; 310; 30; 20 INJECTION, SOLUTION INTRAVENOUS at 06:53

## 2020-08-11 RX ADMIN — ONDANSETRON 4 MG: 2 INJECTION INTRAMUSCULAR; INTRAVENOUS at 13:25

## 2020-08-11 RX ADMIN — PHENYLEPHRINE HYDROCHLORIDE 100 MCG: 10 INJECTION INTRAVENOUS at 12:29

## 2020-08-11 RX ADMIN — GABAPENTIN 600 MG: 300 CAPSULE ORAL at 21:34

## 2020-08-11 RX ADMIN — OXYCODONE HYDROCHLORIDE AND ACETAMINOPHEN 1 TABLET: 5; 325 TABLET ORAL at 21:55

## 2020-08-11 RX ADMIN — PHENYLEPHRINE HYDROCHLORIDE 200 MCG: 10 INJECTION INTRAVENOUS at 12:11

## 2020-08-11 RX ADMIN — PROPOFOL 140 MG: 10 INJECTION, EMULSION INTRAVENOUS at 11:37

## 2020-08-11 RX ADMIN — MIDAZOLAM 2 MG: 1 INJECTION INTRAMUSCULAR; INTRAVENOUS at 14:16

## 2020-08-11 RX ADMIN — PHENYLEPHRINE HYDROCHLORIDE 200 MCG: 10 INJECTION INTRAVENOUS at 12:07

## 2020-08-11 RX ADMIN — Medication 10 MG: at 12:01

## 2020-08-11 RX ADMIN — Medication 40 MCG/MIN: at 13:18

## 2020-08-11 RX ADMIN — Medication 40 MCG/MIN: at 12:28

## 2020-08-11 RX ADMIN — WARFARIN SODIUM 2.5 MG: 2.5 TABLET ORAL at 18:26

## 2020-08-11 RX ADMIN — ACETAMINOPHEN 1000 MG: 500 TABLET, FILM COATED ORAL at 18:26

## 2020-08-11 RX ADMIN — Medication 10 MG: at 12:02

## 2020-08-11 RX ADMIN — PHENYLEPHRINE HYDROCHLORIDE 100 MCG: 10 INJECTION INTRAVENOUS at 12:32

## 2020-08-11 RX ADMIN — PHENYLEPHRINE HYDROCHLORIDE 100 MCG: 10 INJECTION INTRAVENOUS at 11:49

## 2020-08-11 RX ADMIN — PHENYLEPHRINE HYDROCHLORIDE 200 MCG: 10 INJECTION INTRAVENOUS at 12:24

## 2020-08-11 RX ADMIN — Medication 20 MG: at 13:17

## 2020-08-11 RX ADMIN — DEXAMETHASONE SODIUM PHOSPHATE 4 MG: 4 INJECTION, SOLUTION INTRAMUSCULAR; INTRAVENOUS at 10:06

## 2020-08-11 RX ADMIN — CEFAZOLIN 3 G: 1 INJECTION, POWDER, FOR SOLUTION INTRAVENOUS at 11:35

## 2020-08-11 RX ADMIN — BUSPIRONE HYDROCHLORIDE 5 MG: 5 TABLET ORAL at 18:26

## 2020-08-11 RX ADMIN — ALBUTEROL SULFATE 3 PUFF: 90 AEROSOL, METERED RESPIRATORY (INHALATION) at 14:22

## 2020-08-11 RX ADMIN — Medication 20 MG: at 12:11

## 2020-08-11 RX ADMIN — PHENYLEPHRINE HYDROCHLORIDE 200 MCG: 10 INJECTION INTRAVENOUS at 12:17

## 2020-08-11 RX ADMIN — BUSPIRONE HYDROCHLORIDE 5 MG: 5 TABLET ORAL at 21:34

## 2020-08-11 RX ADMIN — PHENYLEPHRINE HYDROCHLORIDE 100 MCG: 10 INJECTION INTRAVENOUS at 12:21

## 2020-08-11 NOTE — PERIOP NOTES
O2 sats dropped in Pre-Op after Block, increased O2 to 3 lpm and notified [facility] of wheezing and work of breathing. Stated that they can give albuterol in OR if needed. OK to proceed.

## 2020-08-11 NOTE — ANESTHESIA PROCEDURE NOTES
Peripheral Block    Start time: 8/11/2020 10:02 AM  End time: 8/11/2020 10:06 AM  Performed by: Nii Sheppard MD  Authorized by: Nii Sheppard MD       Pre-procedure: Indications: at surgeon's request and post-op pain management    Preanesthetic Checklist: patient identified, risks and benefits discussed, site marked, timeout performed, anesthesia consent given and patient being monitored    Timeout Time: 10:02          Block Type:   Block Type:   Interscalene  Monitoring:  Standard ASA monitoring, responsive to questions, oxygen, continuous pulse ox, frequent vital sign checks and heart rate  Injection Technique:  Single shot  Procedures: ultrasound guided    Patient Position: supine  Prep: chlorhexidine    Location:  Interscalene  Needle Type:  Stimuplex  Needle Gauge:  20 G  Needle Localization:  Ultrasound guidance    Assessment:  Number of attempts:  1  Injection Assessment:  Incremental injection every 5 mL, negative aspiration for CSF, no paresthesia, ultrasound image on chart, local visualized surrounding nerve on ultrasound, negative aspiration for blood and no intravascular symptoms  Patient tolerance:  Patient tolerated the procedure well with no immediate complications

## 2020-08-11 NOTE — PROGRESS NOTES
Pt resting in bed. Call light within reach. Bed low to ground. Side rails up x 3. Admission assessment complete. Radial pulses +2. Moves fingers well. Pain well controled at this time. No need voiced at this time.

## 2020-08-11 NOTE — PROGRESS NOTES
Respiratory Mechanics completed and are as follows:  Weaning Parameters  Spontaneous Breathing Trial Complete: Yes  Resp Rate Observed: 18  Ve: 10.5  VT: 746  RSBI: -31  VC: 1025  Patient extubated to a 10L oxygen mask. Patient is able to communicate and is negative for stridor. Breath sounds are clear and diminished. No complications with extubation. Pt left with RN to recover.      Jacoby Russell, RT

## 2020-08-11 NOTE — PERIOP NOTES
TRANSFER - OUT REPORT:    Verbal report given to Colleen(name) on Ventura Vera  being transferred to [facility](unit) for routine post - op       Report consisted of patients Situation, Background, Assessment and   Recommendations(SBAR). Information from the following report(s) SBAR, OR Summary and MAR was reviewed with the receiving nurse. Lines:   Peripheral IV 08/11/20 Left Hand (Active)        Opportunity for questions and clarification was provided.       Patient transported with:   O2 @ 2 liters

## 2020-08-11 NOTE — PERIOP NOTES
While assessing pt,  a bug with the appearance of a bed bug crawled on the stretcher. Pt spouse killed the bug. Pt stated \"we have had problems with bed bugs\". Notified OR desk and supervisor. Awaiting instructions on proceeding.

## 2020-08-11 NOTE — ANESTHESIA PREPROCEDURE EVALUATION
Anesthetic History     PONV          Review of Systems / Medical History  Patient summary reviewed and pertinent labs reviewed    Pulmonary  Within defined limits                 Neuro/Psych         Psychiatric history    Comments: Chronic pain syndrome-long term PO narcotics Cardiovascular    Hypertension: well controlled        Dysrhythmias (on coumadin - last dose 5d ago) : atrial fibrillation      Exercise tolerance: <4 METS     GI/Hepatic/Renal     GERD: well controlled           Endo/Other    Diabetes: well controlled, type 2    Morbid obesity and arthritis     Other Findings              Physical Exam    Airway  Mallampati: II  TM Distance: 4 - 6 cm  Neck ROM: short neck   Mouth opening: Normal     Cardiovascular    Rhythm: regular  Rate: normal         Dental    Dentition: Full upper dentures     Pulmonary  Breath sounds clear to auscultation               Abdominal  GI exam deferred       Other Findings            Anesthetic Plan    ASA: 3  Anesthesia type: general - interscalene block      Post-op pain plan if not by surgeon: peripheral nerve block single    Induction: Intravenous  Anesthetic plan and risks discussed with: Patient      Cardiology assessed and cleared for surgery. Stable afib. In sinus rhythm currently. Warfarin held 5 days. inr 1.4. nerve block in compressible area acceptable risk to proceed. Patient in agreement.

## 2020-08-11 NOTE — PROGRESS NOTES
Patient out from operating room and placed on the ventilator on documented settings. Patient is orally intubated with a # 7.0 ET Tube secured at the 23 cm celia at the lip. Breath sounds are clear and diminished. Trachea is midline. Negative for subcutaneous air, chest excursion is symmetric. Negative for pitting edema. Patient is also Negative for cyanosis. All alarms are set and audible. Resuscitation bag is at the head of the bed.       Ventilator Settings  Mode FIO2 Rate Tidal Volume Pressure PEEP I:E Ratio   Assist control, VC+  30 %    450 ml     8 cm H20         Peak airway pressure: 16 cm H2O   Minute ventilation: 10.2 l/min       Erlinda Solorzano, RT

## 2020-08-12 PROCEDURE — 74011000250 HC RX REV CODE- 250: Performed by: ORTHOPAEDIC SURGERY

## 2020-08-12 PROCEDURE — 97161 PT EVAL LOW COMPLEX 20 MIN: CPT

## 2020-08-12 PROCEDURE — 97530 THERAPEUTIC ACTIVITIES: CPT

## 2020-08-12 PROCEDURE — 74011250636 HC RX REV CODE- 250/636: Performed by: ORTHOPAEDIC SURGERY

## 2020-08-12 PROCEDURE — 74011250637 HC RX REV CODE- 250/637: Performed by: ORTHOPAEDIC SURGERY

## 2020-08-12 PROCEDURE — 77030040361 HC SLV COMPR DVT MDII -B

## 2020-08-12 PROCEDURE — 65270000029 HC RM PRIVATE

## 2020-08-12 PROCEDURE — 97110 THERAPEUTIC EXERCISES: CPT

## 2020-08-12 RX ADMIN — PRAMIPEXOLE DIHYDROCHLORIDE 0.5 MG: 0.25 TABLET ORAL at 22:22

## 2020-08-12 RX ADMIN — ACETAMINOPHEN 1000 MG: 500 TABLET, FILM COATED ORAL at 06:47

## 2020-08-12 RX ADMIN — BUSPIRONE HYDROCHLORIDE 5 MG: 5 TABLET ORAL at 07:56

## 2020-08-12 RX ADMIN — PANTOPRAZOLE SODIUM 40 MG: 40 TABLET, DELAYED RELEASE ORAL at 06:47

## 2020-08-12 RX ADMIN — LEVOTHYROXINE SODIUM 50 MCG: 0.05 TABLET ORAL at 06:47

## 2020-08-12 RX ADMIN — ACETAMINOPHEN 1000 MG: 500 TABLET, FILM COATED ORAL at 00:47

## 2020-08-12 RX ADMIN — BUSPIRONE HYDROCHLORIDE 5 MG: 5 TABLET ORAL at 22:22

## 2020-08-12 RX ADMIN — AMIODARONE HYDROCHLORIDE 100 MG: 200 TABLET ORAL at 07:56

## 2020-08-12 RX ADMIN — ASPIRIN 81 MG: 81 TABLET, COATED ORAL at 07:56

## 2020-08-12 RX ADMIN — CEFAZOLIN SODIUM 2 G: 100 INJECTION, POWDER, LYOPHILIZED, FOR SOLUTION INTRAVENOUS at 03:11

## 2020-08-12 RX ADMIN — WARFARIN SODIUM 5 MG: 5 TABLET ORAL at 17:47

## 2020-08-12 RX ADMIN — GABAPENTIN 600 MG: 300 CAPSULE ORAL at 22:22

## 2020-08-12 RX ADMIN — ALLOPURINOL 300 MG: 300 TABLET ORAL at 07:56

## 2020-08-12 RX ADMIN — BUSPIRONE HYDROCHLORIDE 5 MG: 5 TABLET ORAL at 15:41

## 2020-08-12 RX ADMIN — OXYCODONE HYDROCHLORIDE AND ACETAMINOPHEN 1 TABLET: 5; 325 TABLET ORAL at 12:40

## 2020-08-12 RX ADMIN — OXYCODONE HYDROCHLORIDE AND ACETAMINOPHEN 1 TABLET: 5; 325 TABLET ORAL at 17:47

## 2020-08-12 RX ADMIN — LISINOPRIL 20 MG: 20 TABLET ORAL at 07:57

## 2020-08-12 NOTE — PROGRESS NOTES
Shift assessment completed. Patient alert and oriented x4. Right shoulder incision clean, dry, and intact. Lungs sounds coarse. Patient verbalized understanding of using incentive spirometer. Right upper extremity sensation intact and pulse present at 2+. Bowel sounds present in all four quadrants. Call light in reach, bed in lowest position.

## 2020-08-12 NOTE — PROGRESS NOTES
Alert and oriented  The wound area is benign with no obvious infection  Denies chest pain or dyspnea. Got off o2 last evemning  No inordinate pain in the area of the surgery  I believe this patient needs continued hospital stay for parenteral pain meds as needed. I recommend at least twelve hours without need for high doses of parenteral narcotics for pain management. Needs continued inpatient physiotherapy  Hydration status acceptable. Wean IV fluids as able today. Her posty op radiopgraphs look fine to me.    Disc with PT

## 2020-08-12 NOTE — PROGRESS NOTES
Problem: Mobility Impaired (Adult and Pediatric)  Goal: *Acute Goals and Plan of Care (Insert Text)  Description: GOALS (1-4 days):  (1.)  Patient will move from supine to sit and sit to supine  in bed with MINIMAL ASSIST.    (2.)  Patient will transfer from bed to chair and chair to bed with STAND BY ASSIST using the least restrictive device. (3.)  Patient will ambulate with STAND BY ASSIST for 25 feet with the least restrictive device. (4.)  Patient will be independent with shoulder HEP to increase range of motion per MD orders. ________________________________________________________________________________________________    Outcome: Progressing Towards Goal     PHYSICAL THERAPY: Daily Note and PM 8/12/2020  INPATIENT: Hospital Day: 2  [place] / Product Type: Medicare /      NAME/AGE/GENDER: Jose Glass is a 65 y.o. female   PRIMARY DIAGNOSIS: Mechanical loosening of prosthetic shoulder joint ([address] [S22.757B, Z96.619]  Failed arthroplasty ([address] [T84.019A] Failed arthroplasty ([address] Failed arthroplasty ([address]  Procedure(s) (LRB):  REVISION OF RIGHT REVERSE  TOTAL SHOULDER  TO MOISES ARTHROPLASTY (Right)  1 Day Post-Op  ICD-10: Treatment Diagnosis:   · Pain in Right Shoulder (M25.511)  · Stiffness of Right Shoulder, Not elsewhere classified (M25.611)  · Generalized Muscle Weakness (M62.81)  · Difficulty in walking, Not elsewhere classified (R26.2)   Precaution/Allergies:  Patient has no known allergies. ASSESSMENT:     Ms. Angel Skaggs presents s/p revision of R rev TSA to moises-arthroplasty. MD present during session and explained procedure and that patient has NO movement restrictions and no lifting/weight restrictions with the R UE. Even able to reach behind her back as needed and pull with R UE. Patient required most assist  for bed mobility. Requires assist from spouse at baseline and typically sleeps in a recliner.   Ambulates with a walker at base as well and requires assist for ADLs. She will return home at d/c with assist from her spouse. Patient participated well this am.  Able to ambulate to the [facility] and to the shower. Worked on R UE with minimal difficulty. PM:  Patient continued to participate well. More pain in R UE this afternoon so had more difficulty with pulling herself to standing at the walker. Required 2 person assist from the recliner but only 1 person from the [facility]. CGA for gait in the room. Tolerated exercises well. Patient working on using R UE functionally for ADLs. This section established at most recent assessment   PROBLEM LIST (Impairments causing functional limitations):  1. Decreased [place] with Bed Mobility  2. Decreased [place] with Transfers  3. Decreased [place] with Ambulation   4. Decreased [place] with shoulder HEP   INTERVENTIONS PLANNED: (Benefits and precautions of physical therapy have been discussed with the patient.)  1. Bed Mobility Training  2. Transfer Training  3. Gait Training  4. Therapeutic Exercises per MD orders  5. Modalities for Pain     TREATMENT PLAN: Frequency/Duration: twice daily for duration of hospital stay  Rehabilitation Potential For Stated Goals: Good     RECOMMENDED REHABILITATION/EQUIPMENT: (at time of discharge pending progress): Continue Skilled Therapy. HISTORY:   History of Present Injury/Illness (Reason for Referral):  Revision of R reverse TSA to ruma-arthroplasty  Past Medical History/Comorbidities:   Ms. Radha Olivarez  has a past medical history of Anxiety, Arthritis, Atrial fibrillation ([address] (09/2018), Cancer ([address] Chronic pain, Diabetes ([address] Dysphagia, Edema, GERD (gastroesophageal reflux disease), Gout, Hypertension, Morbid obesity ([address] (10/12/2016), Murmur, cardiac, Nausea & vomiting, Overactive bladder, Psychiatric disorder, Restless leg syndrome, UTI (urinary tract infection), Venous stasis, and Walker as ambulation aid.  She also has no past medical history of Adverse effect of anesthesia, Aneurysm ([address] Arrhythmia, Asthma, Autoimmune disease ([address] CAD (coronary artery disease), Chronic kidney disease, Chronic obstructive pulmonary disease ([address] Coagulation disorder ([address] Difficult intubation, Endocarditis, Heart failure ([address] Liver disease, Malignant hyperthermia due to anesthesia, Nicotine vapor product user, Non-nicotine vapor product user, Pseudocholinesterase deficiency, PUD (peptic ulcer disease), Rheumatic fever, Seizures ([address] Sleep apnea, Stroke ([address] Thromboembolus ([address] or Thyroid disease. Ms. Omer Vidales  has a past surgical history that includes hx cholecystectomy; hx orthopaedic (Right); hx heent (09/2016); hx tonsillectomy; hx cataract removal (Bilateral); hx hysterectomy; hx tubal ligation; pr breast surgery procedure unlisted; hx orthopaedic; hx heent; hx shoulder replacement (Right, 12/2018); and hx shoulder replacement (Left).   Social History/Living Environment:   Home Environment: Private residence  Wheelchair Ramp: Yes  One/Two Story Residence: One story  Living Alone: No  Support Systems: Spouse/Significant Other/Partner  Patient Expects to be Discharged to[de-identified] Private residence  Current DME Used/Available at Home: Raised toilet seat, Shower chair, Walker, rollator, Walker, rolling  Prior Level of Function/Work/Activity:  Ambulate with walker and assist for ADLs; R hand dominant   Number of Personal Factors/Comorbidities that affect the Plan of Care: 1-2: MODERATE COMPLEXITY   EXAMINATION:   Most Recent Physical Functioning:   Gross Assessment:  AROM: Generally decreased, functional  Strength: Generally decreased, functional  Coordination: Generally decreased, functional  RUE AROM  R Shoulder Flexion: 25( before upper trap elevation; 70-degrees AA)         RUE Strength  R Shoulder Flexion: 2  R Elbow Flexion: 3  R : 3+  Posture:     Balance:  Sitting - Static: Good (unsupported)  Sitting - Dynamic: Fair (occasional)  Standing: Pull to stand; With support  Standing - Static: Fair  Standing - Dynamic : Constant support; Fair Bed Mobility:  Supine to Sit: Moderate assistance  Scooting: Moderate assistance  Wheelchair Mobility:     Transfers:  Sit to Stand: Moderate assistance;Assist x2(from recliner; min-mod A x 1 from [facility])  Stand to Sit: Contact guard assistance  Bed to Chair: Contact guard assistance  Gait:     Base of Support: Center of gravity altered  Speed/Nancy: Slow  Step Length: Left shortened;Right shortened  Gait Abnormalities: Decreased step clearance  Distance (ft): 10 Feet (ft)  Assistive Device: Walker, rolling  Ambulation - Level of Assistance: Contact guard assistance  Interventions: Verbal cues      Body Structures Involved:  1. Joints  2. Muscles Body Functions Affected:  1. Movement Related Activities and Participation Affected:  1. Mobility  2. Self Care   Number of elements that affect the Plan of Care: 4+: HIGH COMPLEXITY   CLINICAL PRESENTATION:   Presentation: Stable and uncomplicated: LOW COMPLEXITY   CLINICAL DECISION MAKIN[address] [place] [address] AM-PAC 6 Clicks   Basic Mobility Inpatient Short Form  How much difficulty does the patient currently have. .. Unable A Lot A Little None   1. Turning over in bed (including adjusting bedclothes, sheets and blankets)? [] 1   [x] 2   [] 3   [] 4   2. Sitting down on and standing up from a chair with arms ( e.g., wheelchair, bedside commode, etc.)   [] 1   [] 2   [x] 3   [] 4   3. Moving from lying on back to sitting on the side of the bed? [] 1   [x] 2   [] 3   [] 4   How much help from another person does the patient currently need. .. Total A Lot A Little None   4. Moving to and from a bed to a chair (including a wheelchair)? [] 1   [] 2   [x] 3   [] 4   5. Need to walk in hospital room? [] 1   [] 2   [x] 3   [] 4   6. Climbing 3-5 steps with a railing?    [] 1   [x] 2   [] 3   [] 4   © , Trustees of [address] [place] [address], under license to [employer]. All rights reserved      Score:  Initial: 15 Most Recent: X (Date: -- )    Interpretation of Tool:  Represents activities that are increasingly more difficult (i.e. Bed mobility, Transfers, Gait). Medical Necessity:     · Patient is expected to demonstrate progress in   · strength, range of motion, balance, coordination, and functional technique  ·  to   · increase independence with mobility. · .  Reason for Services/Other Comments:  · Patient continues to require skilled intervention due to   · Decreased R UE function and decreased independence with mobilty. · .   Use of outcome tool(s) and clinical judgement create a POC that gives a: Clear prediction of patient's progress: LOW COMPLEXITY            TREATMENT:   (In addition to Assessment/Re-Assessment sessions the following treatments were rendered)   Pre-treatment Symptoms/Complaints:  Patient needing to toilet. Pain: Initial:   Pain Intensity 1: 3  Post Session:  3/10     Therapeutic Activity: (    15 minutes): Therapeutic activities including Toilet transfers and Ambulation on level ground to improve mobility, strength, balance, and coordination. Required minimal Verbal cues to promote static and dynamic balance in standing. Therapeutic Exercise: (15 Minutes):  Exercises per grid below to improve mobility and strength. Required minimal verbal and manual cues to promote proper body alignment. Progressed range, repetitions, and complexity of movement as indicated.      Date:  8/12/20 Date:   Date:     ACTIVITY/EXERCISE AM PM AM PM AM PM   Gripping 15 A 15 A       Wrist Flexion/Extension 15 A 15 A       Wrist Ulnar/Radial Deviation         Pronation/Supination 15 A 15 A       Elbow Flexion/Extension 15 A 15 A       Shoulder Flexion/Extension 15 AA 15 AA       Shoulder AB/ADduction         Shoulder IR/ER         Pulleys         Pendulums         Shrugs 15 A 15 A       Isometric:                 Flexion 15 15       Extension 15 15       ABduction         ADduction         Biceps/Triceps                  B = bilateral; AA = active assistive; A = active; P = passive  Education:  []  Home Exercises  []  Sling Application   [x]  Movement Precautions-none   []  Pulleys   []  Use of Ice   []  Other:   Treatment/Session Assessment:    · Response to Treatment:  Participated well. More pain this afternoon so more difficulty with pulling for sit to stand. · Interdisciplinary Collaboration:   o Physical Therapist  o Registered Nurse  o Certified Nursing Assistant/Patient Care Technician  · After treatment position/precautions:   o Up in chair  o Bed/Chair-wheels locked  o Call light within reach   · Compliance with Program/Exercises: Will assess as treatment progresses. · Recommendations/Intent for next treatment session:  Treatment next visit will focus on increasing Ms. Lawson's independence with bed mobility, transfers, gait training, strength/ROM exercises, modalities for pain, and patient education.    Total Treatment Duration:  PT Patient Time In/Time Out  Time In: 2503  Time Out: Romario Mcduffie, PT

## 2020-08-12 NOTE — PROGRESS NOTES
Problem: Mobility Impaired (Adult and Pediatric)  Goal: *Acute Goals and Plan of Care (Insert Text)  Description: GOALS (1-4 days):  (1.)  Patient will move from supine to sit and sit to supine  in bed with MINIMAL ASSIST.    (2.)  Patient will transfer from bed to chair and chair to bed with STAND BY ASSIST using the least restrictive device. (3.)  Patient will ambulate with STAND BY ASSIST for 25 feet with the least restrictive device. (4.)  Patient will be independent with shoulder HEP to increase range of motion per MD orders. ________________________________________________________________________________________________    Outcome: Progressing Towards Goal     PHYSICAL THERAPY: Initial Assessment and AM 8/12/2020  INPATIENT: Hospital Day: 2  [place] / Product Type: Medicare /      NAME/AGE/GENDER: Kaushik Thomson is a 65 y.o. female   PRIMARY DIAGNOSIS: Mechanical loosening of prosthetic shoulder joint ([address] [P83.324B, Z96.619]  Failed arthroplasty ([address] [T84.019A] Failed arthroplasty ([address] Failed arthroplasty ([address]  Procedure(s) (LRB):  REVISION OF RIGHT REVERSE  TOTAL SHOULDER  TO MOISES ARTHROPLASTY (Right)  1 Day Post-Op  ICD-10: Treatment Diagnosis:   Pain in Right Shoulder (M25.511)  Stiffness of Right Shoulder, Not elsewhere classified (M25.611)  Generalized Muscle Weakness (M62.81)  Difficulty in walking, Not elsewhere classified (R26.2)   Precaution/Allergies:  Patient has no known allergies. ASSESSMENT:     Ms. Tanner Murillo presents s/p revision of R rev TSA to moises-arthroplasty. MD present during session and explained procedure and that patient has NO movement restrictions and no lifting/weight restrictions with the R UE. Even able to reach behind her back as needed and pull with R UE. Patient required most assist  for bed mobility. Requires assist from spouse at baseline and typically sleeps in a recliner.   Ambulates with a walker at base as well and requires assist for ADLs. She will return home at d/c with assist from her spouse. Patient participated well this am.  Able to ambulate to the [facility] and to the shower. Worked on R UE with minimal difficulty. This section established at most recent assessment   PROBLEM LIST (Impairments causing functional limitations):  Decreased [place] with Bed Mobility  Decreased [place] with Transfers  Decreased [place] with Ambulation   Decreased [place] with shoulder HEP   INTERVENTIONS PLANNED: (Benefits and precautions of physical therapy have been discussed with the patient.)  Bed Mobility Training  Transfer Training  Gait Training  Therapeutic Exercises per MD orders  Modalities for Pain     TREATMENT PLAN: Frequency/Duration: twice daily for duration of hospital stay  Rehabilitation Potential For Stated Goals: Good     RECOMMENDED REHABILITATION/EQUIPMENT: (at time of discharge pending progress): Continue Skilled Therapy. HISTORY:   History of Present Injury/Illness (Reason for Referral):  Revision of R reverse TSA to ruma-arthroplasty  Past Medical History/Comorbidities:   Ms. Maury Knott  has a past medical history of Anxiety, Arthritis, Atrial fibrillation ([address] (09/2018), Cancer ([address] Chronic pain, Diabetes ([address] Dysphagia, Edema, GERD (gastroesophageal reflux disease), Gout, Hypertension, Morbid obesity ([address] (10/12/2016), Murmur, cardiac, Nausea & vomiting, Overactive bladder, Psychiatric disorder, Restless leg syndrome, UTI (urinary tract infection), Venous stasis, and Walker as ambulation aid.  She also has no past medical history of Adverse effect of anesthesia, Aneurysm ([address] Arrhythmia, Asthma, Autoimmune disease ([address] CAD (coronary artery disease), Chronic kidney disease, Chronic obstructive pulmonary disease ([address] Coagulation disorder ([address] Difficult intubation, Endocarditis, Heart failure ([address] Liver disease, Malignant hyperthermia due to anesthesia, Nicotine vapor product user, Non-nicotine vapor product user, Pseudocholinesterase deficiency, PUD (peptic ulcer disease), Rheumatic fever, Seizures ([facility] Sleep apnea, Stroke ([facility] Thromboembolus ([facility] or Thyroid disease. Ms. Radha Olivarez  has a past surgical history that includes hx cholecystectomy; hx orthopaedic (Right); hx heent (09/2016); hx tonsillectomy; hx cataract removal (Bilateral); hx hysterectomy; hx tubal ligation; pr breast surgery procedure unlisted; hx orthopaedic; hx heent; hx shoulder replacement (Right, 12/2018); and hx shoulder replacement (Left). Social History/Living Environment:   Home Environment: Private residence  Wheelchair Ramp: Yes  One/Two Story Residence: One story  Living Alone: No  Support Systems: Spouse/Significant Other/Partner  Patient Expects to be Discharged to[de-identified] Private residence  Current DME Used/Available at Home: Raised toilet seat, Shower chair, Walker, rollator, Walker, rolling  Prior Level of Function/Work/Activity:  Ambulate with walker and assist for ADLs; R hand dominant   Number of Personal Factors/Comorbidities that affect the Plan of Care: 1-2: MODERATE COMPLEXITY   EXAMINATION:   Most Recent Physical Functioning:   Gross Assessment:  AROM: Generally decreased, functional  Strength: Generally decreased, functional  Coordination: Generally decreased, functional  RUE AROM  R Shoulder Flexion: 25( before upper trap elevation; 70-degrees AA)         RUE Strength  R Shoulder Flexion: 2  R Elbow Flexion: 3  R : 3+  Posture:     Balance:  Sitting - Static: Good (unsupported)  Sitting - Dynamic: Fair (occasional)  Standing: Pull to stand; With support  Standing - Static: Constant support; Fair  Standing - Dynamic : Constant support; Fair Bed Mobility:  Supine to Sit: Moderate assistance  Scooting:  Moderate assistance  Wheelchair Mobility:     Transfers:  Sit to Stand: Minimum assistance  Stand to Sit: Contact guard assistance  Bed to Chair: Contact guard assistance;Minimum assistance  Gait:     Base of Support: Center of gravity altered  Speed/Nancy: Slow  Step Length: Left shortened;Right shortened  Gait Abnormalities: Decreased step clearance  Distance (ft): 10 Feet (ft)  Assistive Device: Walker, rolling  Ambulation - Level of Assistance: Contact guard assistance;Minimal assistance  Interventions: Verbal cues      Body Structures Involved:  Joints  Muscles Body Functions Affected: Movement Related Activities and Participation Affected: Mobility  Self Care   Number of elements that affect the Plan of Care: 4+: HIGH COMPLEXITY   CLINICAL PRESENTATION:   Presentation: Stable and uncomplicated: LOW COMPLEXITY   CLINICAL DECISION MAKIN[address] [place] [address] AM-PAC 6 Clicks   Basic Mobility Inpatient Short Form  How much difficulty does the patient currently have. .. Unable A Lot A Little None   1. Turning over in bed (including adjusting bedclothes, sheets and blankets)? [] 1   [x] 2   [] 3   [] 4   2. Sitting down on and standing up from a chair with arms ( e.g., wheelchair, bedside commode, etc.)   [] 1   [] 2   [x] 3   [] 4   3. Moving from lying on back to sitting on the side of the bed? [] 1   [x] 2   [] 3   [] 4   How much help from another person does the patient currently need. .. Total A Lot A Little None   4. Moving to and from a bed to a chair (including a wheelchair)? [] 1   [] 2   [x] 3   [] 4   5. Need to walk in hospital room? [] 1   [] 2   [x] 3   [] 4   6. Climbing 3-5 steps with a railing? [] 1   [x] 2   [] 3   [] 4   © , Trustees of [address] [place] [address], under license to [employer]. All rights reserved      Score:  Initial: 15 Most Recent: X (Date: -- )    Interpretation of Tool:  Represents activities that are increasingly more difficult (i.e. Bed mobility, Transfers, Gait).     Medical Necessity:     Patient is expected to demonstrate progress in   strength, range of motion, balance, coordination, and functional technique   to   increase independence with mobility. .  Reason for Services/Other Comments:  Patient continues to require skilled intervention due to   Decreased R UE function and decreased independence with mobilty. .   Use of outcome tool(s) and clinical judgement create a POC that gives a: Clear prediction of patient's progress: LOW COMPLEXITY            TREATMENT:   (In addition to Assessment/Re-Assessment sessions the following treatments were rendered)   Pre-treatment Symptoms/Complaints:  Patient ready to get up. Pain: Initial:   Pain Intensity 1: 3  Post Session:  3/10     Therapeutic Activity: (    15 minutes): Therapeutic activities including Toilet transfers and Ambulation on level ground to improve mobility, strength, balance, and coordination. Required minimal Verbal cues to promote static and dynamic balance in standing. Therapeutic Exercise: (15 Minutes):  Exercises per grid below to improve mobility and strength. Required minimal verbal and manual cues to promote proper body alignment. Progressed range, repetitions, and complexity of movement as indicated. Date:  8/12/20 Date:   Date:     ACTIVITY/EXERCISE AM PM AM PM AM PM   Gripping 15 A        Wrist Flexion/Extension 15 A        Wrist Ulnar/Radial Deviation         Pronation/Supination 15 A        Elbow Flexion/Extension 15 A        Shoulder Flexion/Extension 15 AA        Shoulder AB/ADduction         Shoulder IR/ER         Pulleys         Pendulums         Shrugs 15 A        Isometric:                 Flexion 15        Extension 15        ABduction         ADduction         Biceps/Triceps                  B = bilateral; AA = active assistive; A = active; P = passive  Education:  []  Home Exercises  []  Sling Application   [x]  Movement Precautions-none   []  Pulleys   []  Use of Ice   []  Other:   Treatment/Session Assessment:    Response to Treatment:  Participated well. No movement restrictions.   Interdisciplinary Collaboration:   Physical Therapist  Registered Nurse  Physician  Certified Nursing Assistant/Patient Care Technician  After treatment position/precautions: In shower with CNA present    Compliance with Program/Exercises: Will assess as treatment progresses. Recommendations/Intent for next treatment session:  Treatment next visit will focus on increasing Ms. Lawson's independence with bed mobility, transfers, gait training, strength/ROM exercises, modalities for pain, and patient education.    Total Treatment Duration:  PT Patient Time In/Time Out  Time In: 0930  Time Out: 0030 [facility]

## 2020-08-13 VITALS
DIASTOLIC BLOOD PRESSURE: 69 MMHG | OXYGEN SATURATION: 91 % | HEART RATE: 89 BPM | BODY MASS INDEX: 59 KG/M2 | RESPIRATION RATE: 16 BRPM | SYSTOLIC BLOOD PRESSURE: 125 MMHG | WEIGHT: 293 LBS | TEMPERATURE: 98.2 F

## 2020-08-13 LAB
INR PPP: 1.8
PROTHROMBIN TIME: 21 SEC (ref 12–14.7)

## 2020-08-13 PROCEDURE — 36415 COLL VENOUS BLD VENIPUNCTURE: CPT

## 2020-08-13 PROCEDURE — 97110 THERAPEUTIC EXERCISES: CPT

## 2020-08-13 PROCEDURE — 97530 THERAPEUTIC ACTIVITIES: CPT

## 2020-08-13 PROCEDURE — 85610 PROTHROMBIN TIME: CPT

## 2020-08-13 PROCEDURE — 74011250637 HC RX REV CODE- 250/637: Performed by: ORTHOPAEDIC SURGERY

## 2020-08-13 RX ORDER — OXYCODONE AND ACETAMINOPHEN 5; 325 MG/1; MG/1
1 TABLET ORAL
Qty: 30 TAB | Refills: 0 | Status: SHIPPED | OUTPATIENT
Start: 2020-08-13 | End: 2020-08-27

## 2020-08-13 RX ADMIN — ACETAMINOPHEN 1000 MG: 500 TABLET, FILM COATED ORAL at 05:35

## 2020-08-13 RX ADMIN — ALLOPURINOL 300 MG: 300 TABLET ORAL at 09:37

## 2020-08-13 RX ADMIN — AMIODARONE HYDROCHLORIDE 100 MG: 200 TABLET ORAL at 09:37

## 2020-08-13 RX ADMIN — ASPIRIN 81 MG: 81 TABLET, COATED ORAL at 09:37

## 2020-08-13 RX ADMIN — BUSPIRONE HYDROCHLORIDE 5 MG: 5 TABLET ORAL at 09:37

## 2020-08-13 RX ADMIN — OXYCODONE HYDROCHLORIDE AND ACETAMINOPHEN 1 TABLET: 5; 325 TABLET ORAL at 09:37

## 2020-08-13 RX ADMIN — PANTOPRAZOLE SODIUM 40 MG: 40 TABLET, DELAYED RELEASE ORAL at 05:36

## 2020-08-13 RX ADMIN — LEVOTHYROXINE SODIUM 50 MCG: 0.05 TABLET ORAL at 05:36

## 2020-08-13 RX ADMIN — OXYCODONE HYDROCHLORIDE AND ACETAMINOPHEN 1 TABLET: 5; 325 TABLET ORAL at 04:22

## 2020-08-13 RX ADMIN — LISINOPRIL 20 MG: 20 TABLET ORAL at 09:37

## 2020-08-13 RX ADMIN — ACETAMINOPHEN 1000 MG: 500 TABLET, FILM COATED ORAL at 01:38

## 2020-08-13 NOTE — DISCHARGE INSTRUCTIONS
[facility]  Shoulder Discharge Instructions    ACTIVITY:   - As tolerated and as directed by Dr. Hassan Lefort  - All range of motion of the right shoulder is OK  - Use your arm sling as needed. It's fine to go without it if you're comfortable without it. - A bath or shower is OK  - Please use the incentive spirometer for at least three more days    DIET:  - Please resume your usual diet    PAIN:  - Take your pain medication(s) as directed by the prescription you were given. Try to not use a narcotic unless non-narcotic pain relievers are not adequate. - Remember that it often helps to take acetaminophen with your pain medicine IF YOUR PAIN MEDICINE DOES NOT CONTAIN ACETAMINOPHEN. You may take up to 6 extra-strength tylenol or up to 9 regular tylenol per 24 hours. - You may also use ibuprofen 800 mg every six hours or aleve 440 mg every 8 hours IN ADDITION TO your prescribed pain medicine  - Call Neela Anderson if pain is NOT relieved by medication. If it's after hours please call our answering service at [phone]. They will have the provider on call for our group call you. - If nausea and vomiting occurs,use the phenergan prescription you were given by Dr Hassan Lefort. If the phenergan doesn't work please call our doctor on call. (Call [phone] if it  is after regular office hours)    DRESSING CARE:  - The surgical glue on your wound may be treated like normal skin. It is OK to wash the wound. It is fine to get it wet. CALL YOUR DOCTOR IF:  - If you experience excessive bleeding that does not stop after holding mild pressure over the area  - Temperature of 100.5°F or greater  - Redness, excessive swelling or bruising, and/or green or yellow, smelly discharge from incision  -Bruising around the wound, down the arm, and even along much of the forearm is very common.     MEDICATIONS:  - Continue your home medications as previously prescribed     Patient Education        Shoulder Replacement Surgery: What to Expect at [facility]     Shoulder replacement surgery replaces the worn parts of your shoulder joint. When you leave the hospital, your arm will be in a sling. It will be helpful if there is someone to help you at home for the next few weeks or until you have more energy and can move around better. You will go home with a bandage and stitches, staples, tissue glue, or tape strips. You can remove the bandage when your doctor tells you to. If you have staples, your doctor will remove them in 10 to 21 days. If you have stitches that are not the type that dissolve, your doctor will remove them in 10 to 14 days. Glue or tape strips will fall off on their own over time. You may still have some mild pain, and the area may be swollen for several months after surgery. Your doctor will give you medicine for the pain. You will continue the rehabilitation program (rehab) you started in the hospital. The better you do with your rehab exercises, the sooner you will get your strength and movement back. Depending on your job, you may be able to return to work as early as 2 to 3 weeks after surgery, as long as you avoid certain arm movements, such as lifting. This care sheet gives you a general idea about how long it will take for you to recover. But each person recovers at a different pace. Follow the steps below to get better as quickly as possible. How can you care for yourself at home? Activity  · Rest when you feel tired. You may take a nap, but don't stay in bed all day. · Work with your physical therapist to learn the best way to exercise. · You will have a sling to wear at night. And it's a good idea to also put a small stack of folded sheets or towels under your upper arm while you are in bed to keep your arm from dropping too far back. · Your arm should stay next to your body or in front of it for several weeks, both while you are up and during sleep.   · Don't lift anything with the affected arm for 6 weeks.  · Ask your doctor when you can drive again. · Ask your doctor when it is okay for you to have sex. · Your doctor may advise you to give up activities that put stress on that shoulder. This includes sports such as weight lifting or tennis, unless your tennis arm was not the one affected. Diet  · By the time you leave the hospital, you will probably be eating your normal diet. If your stomach is upset, try bland, low-fat foods like plain rice, broiled chicken, toast, and yogurt. Your doctor may recommend that you take iron and vitamin supplements. · Drink plenty of fluids (unless your doctor tells you not to). · You may notice that your bowel movements are not regular right after your surgery. This is common. Try to avoid constipation and straining with bowel movements. You may want to take a fiber supplement every day. If you have not had a bowel movement after a couple of days, ask your doctor about taking a mild laxative. Medicines  · Your doctor will tell you if and when you can restart your medicines. He or she will also give you instructions about taking any new medicines. · If you take aspirin or some other blood thinner, ask your doctor if and when to start taking it again. Make sure that you understand exactly what your doctor wants you to do. · Be safe with medicines. Take pain medicines exactly as directed. ? If the doctor gave you a prescription medicine for pain, take it as prescribed. ? If you are not taking a prescription pain medicine, ask your doctor if you can take an over-the-counter medicine. · If you think your pain medicine is making you sick to your stomach:  ? Take your medicine after meals (unless your doctor has told you not to). ? Ask your doctor for a different pain medicine. · If your doctor prescribed antibiotics, take them as directed. Don't stop taking them just because you feel better. You need to take the full course of antibiotics.   · If you take a blood thinner, be sure you get instructions about how to take your medicine safely. Blood thinners can cause serious bleeding problems. Incision care  · If your doctor told you how to care for your cut (incision), follow your doctor's instructions. You will have a dressing over the cut. A dressing helps the incision heal and protects it. Your doctor will tell you how to take care of this. · If you did not get instructions, follow this general advice:  ? If you have strips of tape on the cut the doctor made, leave the tape on for a week or until it falls off.  ? If you have stitches or staples, your doctor will tell you when to come back to have them removed. ? If you have skin adhesive on the cut, leave it on until it falls off. Skin adhesive is also called glue or liquid stitches. ? Change the bandage every day. ? Wash the area daily with warm water, and pat it dry. Don't use hydrogen peroxide or alcohol. They can slow healing. ? You may cover the area with a gauze bandage if it oozes fluid or rubs against clothing. ? You may shower 24 to 48 hours after surgery. Pat the incision dry. Don't swim or take a bath for the first 2 weeks, or until your doctor tells you it is okay. Exercise  · Shoulder rehabilitation is a series of exercises you do after your surgery. This helps you get back your shoulder's range of motion and strength. You will work with your doctor and physical therapist to plan this exercise program. To get the best results, you need to do the exercises correctly and as often and as long as your doctor tells you. Ice  · For pain, put ice or a cold pack on the area for 10 to 20 minutes at a time. Put a thin cloth between the ice and your skin. Follow-up care is a key part of your treatment and safety. Be sure to make and go to all appointments, and call your doctor if you are having problems. It's also a good idea to know your test results and keep a list of the medicines you take.   When should you call for help?   Axia575 anytime you think you may need emergency care. For example, call if:  · You have severe trouble breathing. · You have symptoms of a blood clot in your lung (called a pulmonary embolism). These may include:  ? Sudden chest pain. ? Trouble breathing. ? Coughing up blood. Call your doctor now or seek immediate medical care if:  · You have severe or increasing pain. · You have symptoms of infection, such as:  ? Increased pain, swelling, warmth, or redness. ? Red streaks or pus. ? A fever. · You have tingling, weakness, or numbness in your arm. · Your arm turns cold or changes color. · You have symptoms of a blood clot in your leg (called a deep vein thrombosis). These may include:  ? Pain in the calf, back of the knee, thigh, or groin. ? Redness and swelling in the leg or groin. Watch closely for changes in your health, and be sure to contact your doctor if:  · You do not get better as expected. Where can you learn more? Go to http://rosi-nkaul.info/  Enter X469 in the search box to learn more about \"Shoulder Replacement Surgery: What to Expect at Home. \"  Current as of: March 2, 2020               Content Version: 12.5  © 2418-6752 [employer], [employer]. Care instructions adapted under license by [employer] (which disclaims liability or warranty for this information). If you have questions about a medical condition or this instruction, always ask your healthcare professional. [place] [address] any warranty or liability for your use of this information.              Signed:  Betty Agustin MD  8/13/2020

## 2020-08-13 NOTE — PROGRESS NOTES
Problem: Falls - Risk of  Goal: *Absence of Falls  Description: Document Barbie Machado Fall Risk and appropriate interventions in the flowsheet. Outcome: Progressing Towards Goal  Note: Fall Risk Interventions:  Mobility Interventions: OT consult for ADLs, PT Consult for mobility concerns    Mentation Interventions: Adequate sleep, hydration, pain control    Medication Interventions: Patient to call before getting OOB, Teach patient to arise slowly    Elimination Interventions: Call light in reach, Patient to call for help with toileting needs, Toileting schedule/hourly rounds    History of Falls Interventions:  Investigate reason for fall         Problem: Patient Education: Go to Patient Education Activity  Goal: Patient/Family Education  Outcome: Progressing Towards Goal     Problem: Patient Education: Go to Patient Education Activity  Goal: Patient/Family Education  Outcome: Progressing Towards Goal

## 2020-08-13 NOTE — PROGRESS NOTES
Shift assessment completed via flow sheet. Assisted pt to [facility] then to bed. Pt tolerated fairly well. Pt a/o x 4. No signs of distress or c/o pain at this time. Right shoulder incision CDI. Lung sounds coarse bilaterally, Safety measures in place. Instructed pt to call for assistance. Will continue to monitor.

## 2020-08-13 NOTE — PROGRESS NOTES
Shift assessment completed. Patient alert and oriented x4. Wheezing heard on auscultation bilaterally. Educated patient on using incentive spirometer. Patient verbalized understanding. RUE incision clean, dry, and intact with topical adhesive skin glue. No neurovascular deficits noted. Patient resting in bed. No needs expressed. Bed locked and in lowest position. Call light within reach.

## 2020-08-14 ENCOUNTER — PATIENT OUTREACH (OUTPATIENT)
Dept: CASE MANAGEMENT | Age: 70
End: 2020-08-14

## 2020-08-14 LAB
BACTERIA SPEC CULT: NORMAL
GRAM STN SPEC: NORMAL
GRAM STN SPEC: NORMAL
SERVICE CMNT-IMP: NORMAL

## 2020-08-14 NOTE — DISCHARGE SUMMARY
[address] [facility] SUMMARY    Name:  Francesco Farr  MR#:  [id number]  :  1950  ACCOUNT #:  [de-identified]  ADMIT DATE:  2020  DISCHARGE DATE:  2020    ADMISSION DIAGNOSES:  1.  Morbid obesity. 2.  Failed reverse shoulder arthroplasty, right shoulder. DISCHARGE DIAGNOSES:  1.  Morbid obesity. 2.  Failed reverse shoulder arthroplasty, right shoulder. 3.  Respiratory failure, requiring mechanical ventilation. COURSE IN HOSPITAL:  The patient underwent elective surgery for revision of her right shoulder arthroplasty. She had respiratory failure that resolved with mechanical ventilation. She was weaned to no oxygen supplement uneventfully and by the time of discharge was saturating well with no dyspnea. Her wound was healing well and her pain was controlled with oral medications. She was tolerating a regular diet. She was discharged home with detailed instructions in the electronic portion of this chart.       Meseret Archibald MD      [id number]  D:  2020 12:38  T:  2020 15:07  JOB #:  [id number]

## 2020-08-14 NOTE — PROGRESS NOTES
Transition of Care Hospital Discharge Follow-Up      Date/Time:  2020 10:35 AM    Patient was admitted to [facility] on 2020 and discharged on 2020 for Failed Arthroplasty. The physician discharge summary was available at the time of outreach. Patient was contacted within 7 business days of discharge. Inpatient RUR score: 2  Was this a readmission? no   Patient stated reason for the readmission: none  Patients top risk factors for readmission: Failed Arthroplasty      LPN Care Coordinator contacted the patient by telephone to perform post hospital discharge assessment. Verified name and  with patient as identifiers. Provided introduction to self, and explanation of the Care Coordinator role. Patient received hospital discharge instructions. LPN reviewed discharge instructions and red flags with patient who verbalized understanding. Patient given an opportunity to ask questions and does not have any further questions or concerns at this time. The patient agrees to contact the PCP office for questions related to their healthcare. LPN provided contact information for future reference. Home Health orders at discharge: [address]:   Date of initial or scheduled visit:  (Assist with coordination of services if necessary.)    Durable Medical Equipment ordered at discharge: none  [address]:   [address] received:   (Assist patient in obtaining DME orders &/or equipment if necessary.)    Medication(s):   Medication review was performed with patient, who verbalizes understanding of administration of home medications. There were no barriers to obtaining medications identified at this time. Current Outpatient Medications   Medication Sig    oxyCODONE-acetaminophen (PERCOCET) 5-325 mg per tablet Take 1 Tab by mouth every six (6) hours as needed for Pain for up to 14 days. Max Daily Amount: 4 Tabs.     levothyroxine (SYNTHROID) 50 mcg tablet Take 50 mcg by mouth daily. Take / use AM day of surgery  per anesthesia protocols. Indications: a condition with low thyroid hormone levels    aspirin delayed-release 81 mg tablet Take 81 mg by mouth daily. Take / use AM day of surgery  per anesthesia protocols.  warfarin (COUMADIN) 2.5 mg tablet Take 2.5 mg by mouth nightly. On Wednesdays takes 2 tablets  Indications: treatment to prevent blood clots in chronic atrial fibrillation    allopurinol (ZYLOPRIM) 300 mg tablet Take 300 mg by mouth daily. Take / use AM day of surgery  per anesthesia protocols. Indications: treatment to prevent acute gout attack    meloxicam (MOBIC) 15 mg tablet Take 15 mg by mouth daily as needed for Pain. Indications: a type of joint disorder due to excess uric acid in the blood called gout    amiodarone (CORDARONE) 200 mg tablet Take 200 mg by mouth daily. Take / use AM day of surgery  per anesthesia protocols. Indications: prevention of recurrent atrial fibrillation    cyanocobalamin, vitamin B-12, (VITAMIN B-12) 5,000 mcg subl 1 Tab by SubLINGual route daily.  VITAMIN B COMPLEX PO Take 1 Tab by mouth daily.  busPIRone (BUSPAR) 5 mg tablet Take 5 mg by mouth three (3) times daily. Take morning of surgery per anesthesia guidelines. Indications: repeated episodes of anxiety    gabapentin (NEURONTIN) 600 mg tablet Take 600 mg by mouth two (2) times a day. Takes 1 tablet in morning and 2 tablets at bedtime; Take / use AM day of surgery  per anesthesia protocols. Indications: restless legs syndrome, an extreme discomfort in the calf muscles when sitting or lying down    lisinopril (PRINIVIL, ZESTRIL) 20 mg tablet Take 20 mg by mouth daily. Indications: HYPERTENSION    lidocaine (LIDODERM) 5 % 1 Patch by TransDERmal route as needed. Apply patch to the affected area for 12 hours a day and remove for 12 hours a day.  omeprazole (PRILOSEC) 20 mg capsule Take 20 mg by mouth two (2) times a day.  Take morning of surgery per anesthesia guidelines. Indications: GASTROESOPHAGEAL REFLUX    potassium chloride SR (KLOR-CON 8) 8 mEq tablet Take 8 mEq by mouth two (2) times a day. Take / use AM day of surgery  per anesthesia protocols. Indications: prevention of low potassium in the blood    pramipexole (MIRAPEX) 0.25 mg tablet Take 0.25 mg by mouth nightly. Takes 2 tablets at bedtime  Indications: restless legs syndrome, an extreme discomfort in the calf muscles when sitting or lying down     No current facility-administered medications for this visit. There are no discontinued medications. ADL assessment:   (If patient is unable to perform ADLs  what is the limiting factor(s)? Do they have a support system that can assist? If no support system is present, discuss possible assistance that they may be able to obtain. Escalate for SW if ongoing issues are verbalized by pt or anticipated)    [facility] follow up appointment(s): No future appointments. Non-[facility] follow up appointment(s):   7 Day follow up with PCP or Specialist:Dr. Keshia Fernandez 8/24/2020   Transportation arranged: none    Covid Risk Education    Patient has following risk factors of: Failed Arthroplasty. Education provided regarding infection prevention, and signs and symptoms of COVID-19 and when to seek medical attention with patient who verbalized understanding. Discussed exposure protocols and quarantine From CDC: Are you at higher risk for severe illness?  and given an opportunity for questions and concerns. The patient agrees to contact the COVID-19 hotline [phone] or PCP office for questions related to COVID-19.      For more information on steps you can take to protect yourself, see CDC's How to Protect Yourself     Patient/family/caregiver given information for Julio Flores and agrees to enroll no  Patient's preferred e-mail: declines  Patient's preferred phone number: declines      Any other questions or concerns expressed by patient? Patient denies any concerns at this time.   Scheduled next follow up call or referral to CTN/ ACM:  Next follow up call:within 14 days    Goals Addressed                 This Visit's Progress     Attends follow up appointments on schedule        Takes Medications as prescribed

## 2020-08-14 NOTE — OP NOTES
[address] [address]  OPERATIVE REPORT    Name:  Juwan Brown  MR#:  [id number]  :  1950  ACCOUNT #:  [de-identified]  DATE OF SERVICE:  2020    PREOPERATIVE DIAGNOSIS:  Failed reverse sholder arthroplasty, right shoulder. POSTOPERATIVE DIAGNOSIS:  Failed reverse sholder arthroplasty, right shoulder. PROCEDURE PERFORMED:  Revision right reverse shoulder arthroplasty, unusual and complex. SURGEON:  Betty Agustin MD    ASSISTANT:  .    ANESTHESIA:  .    COMPLICATIONS:  none. SPECIMENS REMOVED:  yes. IMPLANTS:  yes. ESTIMATED BLOOD LOSS:  300 mL. FINDINGS:  The procedure was unusual and complex because of her body mass index of 59. The body mass index prolonged her operative time, increased her blood loss, made surgical exposure much more difficult than average, and increased her risks risk for this operative procedure. PROCEDURE IN DETAIL:  In the operating room, she was anesthetized. In the beach-chair position, her right shoulder was prepped and draped in a sterile fashion. Her old scar was excised. I incised through the subcutaneous fat layer to the deltopectoral interval.  The deltopectoral interval was bluntly developed and the cephalic vein was retracted laterally. Dense scar tissue anterior to the failed glenosphere was incised with electrocautery. Fluid here was sent for culture. It did not have an odor. The volume was about 25 mL and it looked like hemorrhagic fluid. The glenosphere was completely  from the glenoid. It had rotated such that most of the screws were pointing anteriorly. The glenosphere base plate and screws were removed. Extensive fibrinous tissue in the glenoid was removed revealing a large cavitary lesion in the central portion of the glenoid surrounded by peripheral glenoid bone with no erosion of the periphery at the glenoid. The humerus component baseplate was removed after removing the polyethylene component.   The poly shows substantial wear. A replicator was appropriately positioned, its bolt tightened and  A CTA hemiarthroplasty head was secured to the baseplate after extensive removal of fibrinous tissue and inflammatory tissue. The joint was reduced, and excellent range of motion and stability was noted. A head was chosen by the use of trial.  The actual prosthetic humeral head was impacted on the [facility] FOR CONTINUING [facility]. The joint was reduced and the above range was noted. After more thorough irrigation, closure was with interrupted and short runs of 0 Vicryl in the deltopectoral interval.  The subcutaneous was closed with interrupted Monocryl. The skin was closed with subcuticular Monocryl, followed by surgical glue. Her blood loss was estimated at 300 mL. Specimen was culture as described above and the surgeon finished his part. The patient remained under intense monitoring in the operating room for about one additional hour due to respiratory failure. The details of that are elsewhere in the chart. A shoulder immobilizer was applied and she was eventually moved to the recovery room. MD KODY [id number]  D:  08/13/2020 12:48  T:  08/13/2020 21:13  JOB #:  [id number]  CC:   Zully Goodrich MD

## 2020-08-28 ENCOUNTER — PATIENT OUTREACH (OUTPATIENT)
Dept: CASE MANAGEMENT | Age: 70
End: 2020-08-28

## 2020-08-28 NOTE — PROGRESS NOTES
Transition of Care Hospital Discharge Follow-Up      Date/Time:  2020 11:58 AM    [facility] Care Coordinator contacted the patient by telephone to perform post hospital follow up. Verified name and  with patient as identifiers. LPN reinforced discharge instructions and red flags with patient who verbalized understanding. Patient given an opportunity to ask questions and does not have any further questions or concerns at this time. The patient agrees to contact the PCP office for questions related to their healthcare    [facility] follow up appointment(s): No future appointments. Non-[facility] follow up appointment(s):   Patient attended follow up appointments since last contact: Dr. Jose Maria Rubio 2020  What was the outcome of the appointment:     [address]: [address]:   Any issues/ progress:  (Assist with coordination of services if necessary.)      Medication(s):   Medication changes since discharge:     Current Outpatient Medications   Medication Sig    levothyroxine (SYNTHROID) 50 mcg tablet Take 50 mcg by mouth daily. Take / use AM day of surgery  per anesthesia protocols. Indications: a condition with low thyroid hormone levels    aspirin delayed-release 81 mg tablet Take 81 mg by mouth daily. Take / use AM day of surgery  per anesthesia protocols.  warfarin (COUMADIN) 2.5 mg tablet Take 2.5 mg by mouth nightly. On  takes 2 tablets  Indications: treatment to prevent blood clots in chronic atrial fibrillation    allopurinol (ZYLOPRIM) 300 mg tablet Take 300 mg by mouth daily. Take / use AM day of surgery  per anesthesia protocols. Indications: treatment to prevent acute gout attack    meloxicam (MOBIC) 15 mg tablet Take 15 mg by mouth daily as needed for Pain. Indications: a type of joint disorder due to excess uric acid in the blood called gout    amiodarone (CORDARONE) 200 mg tablet Take 200 mg by mouth daily. Take / use AM day of surgery  per anesthesia protocols. Indications: prevention of recurrent atrial fibrillation    cyanocobalamin, vitamin B-12, (VITAMIN B-12) 5,000 mcg subl 1 Tab by SubLINGual route daily.  VITAMIN B COMPLEX PO Take 1 Tab by mouth daily.  busPIRone (BUSPAR) 5 mg tablet Take 5 mg by mouth three (3) times daily. Take morning of surgery per anesthesia guidelines. Indications: repeated episodes of anxiety    gabapentin (NEURONTIN) 600 mg tablet Take 600 mg by mouth two (2) times a day. Takes 1 tablet in morning and 2 tablets at bedtime; Take / use AM day of surgery  per anesthesia protocols. Indications: restless legs syndrome, an extreme discomfort in the calf muscles when sitting or lying down    lisinopril (PRINIVIL, ZESTRIL) 20 mg tablet Take 20 mg by mouth daily. Indications: HYPERTENSION    lidocaine (LIDODERM) 5 % 1 Patch by TransDERmal route as needed. Apply patch to the affected area for 12 hours a day and remove for 12 hours a day.  omeprazole (PRILOSEC) 20 mg capsule Take 20 mg by mouth two (2) times a day. Take morning of surgery per anesthesia guidelines. Indications: GASTROESOPHAGEAL REFLUX    potassium chloride SR (KLOR-CON 8) 8 mEq tablet Take 8 mEq by mouth two (2) times a day. Take / use AM day of surgery  per anesthesia protocols. Indications: prevention of low potassium in the blood    pramipexole (MIRAPEX) 0.25 mg tablet Take 0.25 mg by mouth nightly. Takes 2 tablets at bedtime  Indications: restless legs syndrome, an extreme discomfort in the calf muscles when sitting or lying down     No current facility-administered medications for this visit. Other Issues/ Miscellaneous? (Transportation, access to meals, ability to perform ADLs, adequate caregiver support, etc.)  Referrals needed? (SW, Diabetes education, HH, etc.)    Any other questions or concerns expressed by patient? Patient voiced no concerns at this time    Schedule next appointment with ERROL PELAYO or referral to CTN/ ACM:  Graduation:Yes  Next Outreach Scheduled:     Goals      Attends follow up appointments on schedule      Takes Medications as prescribed

## 2020-09-04 LAB
BACTERIA SPEC CULT: NORMAL
SERVICE CMNT-IMP: NORMAL

## 2023-03-03 NOTE — PROGRESS NOTES
Care Management Interventions  Mode of Transport at Discharge: BLS  Transition of Care Consult (CM Consult): SNF  Partner SNF: No  Reason Why Partner SNF Not Chosen: Location  Physical Therapy Consult: Yes  Current Support Network: Lives with Spouse  Plan discussed with Pt/Family/Caregiver: Yes  Freedom of Choice Offered: Yes  Discharge Location  Discharge Placement: Skilled nursing facility    Patient is a 77 yr old s/p shoulder surgery. She lives with her  in [place], [place]. BODØ with PT approached me today with concerns about patient returning home with spouse. She feels patient could benefit from rehab due to patient being  5'2\" and 285 lbs and now very limited with transfer assistance due to shoulder surgery and problems with her knees. I met with pt, son and spouse and discussed their wishes. Patient and family all agree that rehab is a great idea and they would love for her to go to [facility] which is very close to her home. She is familiar with the facility due to a prior positive admission several years ago. PPD placed and referral called and faxed to their admission coordinator. [facility] has an acute rehab and skilled care. If approved for acute, patient could go on Friday 12-21 if stable. If approved for skilled- patient could go on Sat. after three night stay. At present, patient may require ambulance transport.   Will follow and update MD. Ayana Jenkins Ear Wedge Repair Text: A wedge excision was completed by carrying down an excision through the full thickness of the ear and cartilage with an inward facing Burow's triangle. The wound was then closed in a layered fashion.

## (undated) DEVICE — SUTURE MCRYL SZ 3-0 L27IN ABSRB UD L24MM PS-1 3/8 CIR PRIM Y936H

## (undated) DEVICE — SUTURE MCRYL SZ 2-0 L27IN ABSRB UD CP-1 1 L36MM 1/2 CIR REV Y266H

## (undated) DEVICE — SYR BULB 60ML IRRIGATION -- CONVERT TO ITEM 116413

## (undated) DEVICE — COVER BK TBL HVY DUT 70X110IN --

## (undated) DEVICE — 60 ML SYRINGE LUER-LOCK TIP: Brand: MONOJECT

## (undated) DEVICE — 3M™ TEGADERM™ TRANSPARENT FILM DRESSING FRAME STYLE, 1626W, 4 IN X 4-3/4 IN (10 CM X 12 CM), 50/CT 4CT/CASE: Brand: 3M™ TEGADERM™

## (undated) DEVICE — HEX-LOCKING BLADE ELECTRODE: Brand: EDGE

## (undated) DEVICE — IMMOBILIZER SHOULDER SLING SWATHE DLX

## (undated) DEVICE — UTILITY MARKER,BLACK WITH LABELS: Brand: DEVON

## (undated) DEVICE — SPONGE LAP 18X18IN STRL -- 5/PK

## (undated) DEVICE — WAX SURG 2.5GM HEMSTAT BNE BEESWAX PARAFFIN ISO PALMITATE

## (undated) DEVICE — DRAPE,SHOULDER,BEACH CHAIR,STERILE: Brand: MEDLINE

## (undated) DEVICE — 3M™ COBAN™ NL STERILE NON-LATEX SELF-ADHERENT WRAP, 2084S, 4 IN X 5 YD (10 CM X 4,5 M), 18 ROLLS/CASE: Brand: 3M™ COBAN™

## (undated) DEVICE — STOCKINETTE TUBE 9X48 -- MEDICHOICE

## (undated) DEVICE — SUTURE ETHBND EXCEL SZ 2 L27IN NONABSORBABLE GRN WHT MO-7 D7485

## (undated) DEVICE — GOWN,REINF,POLY,ECL,PP SLV,XL: Brand: MEDLINE

## (undated) DEVICE — BANDAGE COMPR SELF ADH 5 YDX4 IN TAN STRL PREMIERPRO LF

## (undated) DEVICE — SUTURE VCRL SZ 0 L27IN ABSRB UD L36MM CP-1 1/2 CIR REV CUT J267H

## (undated) DEVICE — Device

## (undated) DEVICE — NEEDLE HYPO 25GA L5/8IN ORNG HUB S STL LATCH BVL UP

## (undated) DEVICE — SYRINGE IRRIG 60ML SFT PLIABLE BLB EZ TO GRP 1 HND USE W/

## (undated) DEVICE — GARMENT,MEDLINE,DVT,INT,CALF,XL,GEN2: Brand: MEDLINE

## (undated) DEVICE — SYR LR LCK 1ML GRAD NSAF 30ML --

## (undated) DEVICE — (D)STRIP SKN CLSR 0.5X4IN WHT --

## (undated) DEVICE — COVER,TABLE,HEAVY DUTY,79"X110",STRL: Brand: MEDLINE

## (undated) DEVICE — BIT DRL KIT SHLDR 2MM/3.2MM -- DISP EQUINOXE

## (undated) DEVICE — 3M™ IOBAN™ 2 ANTIMICROBIAL INCISE DRAPE 6650EZ: Brand: IOBAN™ 2

## (undated) DEVICE — SINGLE BASIN: Brand: CARDINAL HEALTH

## (undated) DEVICE — SET IRRIG W 96IN TBNG 4 LN FLX BG

## (undated) DEVICE — INTENDED FOR TISSUE SEPARATION, AND OTHER PROCEDURES THAT REQUIRE A SHARP SURGICAL BLADE TO PUNCTURE OR CUT.: Brand: BARD-PARKER SAFETY BLADES SIZE 11, STERILE

## (undated) DEVICE — SOLUTION IRRIG 3000ML 0.9% SOD CHL FLX CONT 0797208] ICU MEDICAL INC]

## (undated) DEVICE — BUTTON SWITCH PENCIL BLADE ELECTRODE, HOLSTER: Brand: EDGE

## (undated) DEVICE — GOWN,REINFORCED,POLY,AURORA,XXLARGE,STR: Brand: MEDLINE

## (undated) DEVICE — DRAPE,TOP,102X53,STERILE: Brand: MEDLINE

## (undated) DEVICE — [RESECTOR CUTTER, ARTHROSCOPIC SHAVER BLADE,  DO NOT RESTERILIZE,  DO NOT USE IF PACKAGE IS DAMAGED,  KEEP DRY,  KEEP AWAY FROM SUNLIGHT]: Brand: FORMULA

## (undated) DEVICE — (D)PREP SKN CHLRAPRP APPL 26ML -- CONVERT TO ITEM 371833

## (undated) DEVICE — MEDI-VAC NON-CONDUCTIVE SUCTION TUBING: Brand: CARDINAL HEALTH

## (undated) DEVICE — 3000CC GUARDIAN II: Brand: GUARDIAN

## (undated) DEVICE — CARDINAL HEALTH FLEXIBLE LIGHT HANDLE COVER: Brand: CARDINAL HEALTH

## (undated) DEVICE — YANKAUER,BULB TIP,W/O VENT,RIGID,STERILE: Brand: MEDLINE

## (undated) DEVICE — COVER,MAYO STAND,STERILE: Brand: MEDLINE

## (undated) DEVICE — NDL SPNE QNCKE 18GX3.5IN LF --

## (undated) DEVICE — SOLUTION IV 1000ML 0.9% SOD CHL

## (undated) DEVICE — SURGICAL PROCEDURE PACK BASIC ST FRANCIS

## (undated) DEVICE — APPLICATOR BNDG 1MM ADH PREMIERPRO EXOFIN

## (undated) DEVICE — SLING ARM AD ULT

## (undated) DEVICE — HYPODERMIC SAFETY NEEDLE: Brand: MAGELLAN

## (undated) DEVICE — CLEAR-TRAC THREADED CANNULA, 8.0                                    MM I.D., 76 MM LONG, GREEN, LATEX SEAL: Brand: CLEAR-TRAC

## (undated) DEVICE — OSCILLATING SAW BLDE 13X1.4X70MM

## (undated) DEVICE — REM POLYHESIVE ADULT PATIENT RETURN ELECTRODE: Brand: VALLEYLAB

## (undated) DEVICE — SUTURE PERMAHAND SZ 2-0 L12X18IN NONABSORBABLE BLK SILK A185H

## (undated) DEVICE — DRAPE TWL SURG 16X26IN BLU ORB04] ALLCARE INC]

## (undated) DEVICE — INTENDED FOR TISSUE SEPARATION, AND OTHER PROCEDURES THAT REQUIRE A SHARP SURGICAL BLADE TO PUNCTURE OR CUT.: Brand: BARD-PARKER ® STAINLESS STEEL BLADES

## (undated) DEVICE — MASTISOL ADHESIVE LIQ 2/3ML

## (undated) DEVICE — GUARDIAN LVC: Brand: GUARDIAN

## (undated) DEVICE — MEDI-VAC YANKAUER SUCTION HANDLE W/BULBOUS TIP: Brand: CARDINAL HEALTH

## (undated) DEVICE — CONTAINER,SPECIMEN,O.R.STRL,4.5OZ: Brand: MEDLINE

## (undated) DEVICE — ACROMIOPLASTY ELECTRODE (ELECTRODE ONLY): Brand: CONMED